# Patient Record
Sex: FEMALE | Race: WHITE | Employment: OTHER | URBAN - METROPOLITAN AREA
[De-identification: names, ages, dates, MRNs, and addresses within clinical notes are randomized per-mention and may not be internally consistent; named-entity substitution may affect disease eponyms.]

---

## 2017-03-10 ENCOUNTER — HOSPITAL ENCOUNTER (OUTPATIENT)
Dept: VASCULAR SURGERY | Age: 62
Discharge: HOME | End: 2017-03-10
Attending: FAMILY MEDICINE
Payer: COMMERCIAL

## 2017-03-10 DIAGNOSIS — I80.9 PHLEBITIS AND THROMBOPHLEBITIS OF UNSPECIFIED SITE: ICD-10-CM

## 2017-03-10 PROCEDURE — 93971 EXTREMITY STUDY: CPT

## 2017-03-10 NOTE — PROCEDURES
[address]  *** FINAL REPORT ***    Name: Dilma Mcclure  MRN: [id number]    Outpatient  : 30 Sep 1955  HIS Order #: [id number]  [id number] [facility] Visit #: [id number]  Date: 10 Mar 2017    TYPE OF TEST: Peripheral Venous Testing    REASON FOR TEST  Pain in limb, Limb swelling    Left Leg:-  Deep venous thrombosis:           No  Superficial venous thrombosis:    No  Deep venous insufficiency:        No  Superficial venous insufficiency: No      INTERPRETATION/FINDINGS  PROCEDURE:  LEFT LOWER EXTREMITY VENOUS DUPLEX . Evaluation of lower  extremity veins with ultrasound (B-mode imaging, pulsed Doppler, color   Doppler). Includes the common femoral, deep femoral, femoral,  popliteal, posterior tibial, peroneal, and great saphenous veins. Other veins, for example the gastrocnemius and soleal veins, may also  be visualized. FINDINGS: Jerelene Mungo scale and color flow duplex images of the veins in the  left lower extremity demonstrate normal compressibility, spontaneous  and augmented flow profiles, and absence of filling defects throughout   the deep and superficial veins in the left lower extremity. CONCLUSION: Left lower extremity venous duplex negative for deep  venous thrombosis or thrombophlebitis. Right common femoral vein is  thrombus free. ADDITIONAL COMMENTS    I have personally reviewed the data relevant to the interpretation of  this  study. TECHNOLOGIST: Clara Barba RVT  Signed: 03/10/2017 05:24 PM    PHYSICIAN: Sharifa Massey.  Colonel Montserrat MD  Signed: 2017 08:03 AM

## 2022-08-01 ENCOUNTER — OFFICE VISIT (OUTPATIENT)
Dept: FAMILY MEDICINE CLINIC | Age: 67
End: 2022-08-01
Payer: MEDICARE

## 2022-08-01 VITALS
HEART RATE: 66 BPM | HEIGHT: 64 IN | DIASTOLIC BLOOD PRESSURE: 75 MMHG | SYSTOLIC BLOOD PRESSURE: 111 MMHG | OXYGEN SATURATION: 98 % | TEMPERATURE: 98.1 F

## 2022-08-01 DIAGNOSIS — Z90.49 HISTORY OF PARTIAL COLECTOMY: ICD-10-CM

## 2022-08-01 DIAGNOSIS — R19.8 CHANGE IN BOWEL FUNCTION: ICD-10-CM

## 2022-08-01 DIAGNOSIS — M81.0 OSTEOPOROSIS WITHOUT CURRENT PATHOLOGICAL FRACTURE, UNSPECIFIED OSTEOPOROSIS TYPE: ICD-10-CM

## 2022-08-01 DIAGNOSIS — Z11.59 NEED FOR HEPATITIS C SCREENING TEST: ICD-10-CM

## 2022-08-01 DIAGNOSIS — G47.00 INSOMNIA, UNSPECIFIED TYPE: ICD-10-CM

## 2022-08-01 DIAGNOSIS — M15.9 PRIMARY OSTEOARTHRITIS INVOLVING MULTIPLE JOINTS: Primary | ICD-10-CM

## 2022-08-01 DIAGNOSIS — R63.5 WEIGHT GAIN: ICD-10-CM

## 2022-08-01 DIAGNOSIS — K76.0 FATTY LIVER: ICD-10-CM

## 2022-08-01 DIAGNOSIS — N28.9 KIDNEY LESION: ICD-10-CM

## 2022-08-01 DIAGNOSIS — Z86.718 HISTORY OF DVT (DEEP VEIN THROMBOSIS): ICD-10-CM

## 2022-08-01 PROBLEM — M15.0 PRIMARY OSTEOARTHRITIS INVOLVING MULTIPLE JOINTS: Status: ACTIVE | Noted: 2022-08-01

## 2022-08-01 PROCEDURE — 1101F PT FALLS ASSESS-DOCD LE1/YR: CPT | Performed by: FAMILY MEDICINE

## 2022-08-01 PROCEDURE — G8421 BMI NOT CALCULATED: HCPCS | Performed by: FAMILY MEDICINE

## 2022-08-01 PROCEDURE — G0463 HOSPITAL OUTPT CLINIC VISIT: HCPCS | Performed by: FAMILY MEDICINE

## 2022-08-01 PROCEDURE — G8427 DOCREV CUR MEDS BY ELIG CLIN: HCPCS | Performed by: FAMILY MEDICINE

## 2022-08-01 PROCEDURE — 99204 OFFICE O/P NEW MOD 45 MIN: CPT | Performed by: FAMILY MEDICINE

## 2022-08-01 PROCEDURE — 3017F COLORECTAL CA SCREEN DOC REV: CPT | Performed by: FAMILY MEDICINE

## 2022-08-01 PROCEDURE — 1123F ACP DISCUSS/DSCN MKR DOCD: CPT | Performed by: FAMILY MEDICINE

## 2022-08-01 PROCEDURE — G8432 DEP SCR NOT DOC, RNG: HCPCS | Performed by: FAMILY MEDICINE

## 2022-08-01 PROCEDURE — 1090F PRES/ABSN URINE INCON ASSESS: CPT | Performed by: FAMILY MEDICINE

## 2022-08-01 PROCEDURE — G8536 NO DOC ELDER MAL SCRN: HCPCS | Performed by: FAMILY MEDICINE

## 2022-08-01 RX ORDER — DICLOFENAC SODIUM 10 MG/G
2 GEL TOPICAL 4 TIMES DAILY
Qty: 350 G | Refills: 0 | Status: SHIPPED | OUTPATIENT
Start: 2022-08-01 | End: 2022-10-05

## 2022-08-01 RX ORDER — CAPSAICIN 0.1 %
CREAM (GRAM) TOPICAL 3 TIMES DAILY
Qty: 56.6 G | Refills: 0 | Status: SHIPPED | OUTPATIENT
Start: 2022-08-01 | End: 2022-10-05

## 2022-08-01 RX ORDER — MELOXICAM 15 MG/1
15 TABLET ORAL EVERY OTHER DAY
COMMUNITY

## 2022-08-01 RX ORDER — ALENDRONATE SODIUM 35 MG/1
TABLET ORAL
COMMUNITY

## 2022-08-01 NOTE — PROGRESS NOTES
Rinku James (: 1955) is a 78 y.o. female, new patient, here for evaluation of the following chief complaint(s):  Establish Care, Arthritis ((R) hand, (L) foot and thinks it may be starting in her (R) hip.), and Bowel Incontinence (Goes about 3 to 4 times a day, feels as though bowel is never empty. )         ASSESSMENT/PLAN:  Below is the assessment and plan developed based on review of pertinent history, physical exam, labs, studies, and medications. 1. Primary osteoarthritis involving multiple joints  Right hand and left foot  Continue Mobic as needed. Discussed with patient long-term risk of chronic NSAID use including gastric ulcers or development of CKD. Continue Motrin as needed  Patient also try capsaicin, do not recommend using this on her hand due to risk of irritation if she gets it in her eye or other mucosal membranes. -     diclofenac (VOLTAREN) 1 % gel; Apply 2 g to affected area four (4) times daily. , Normal, Disp-350 g, R-0  -     capsaicin (CAPZASIN-HP) 0.1 % topical cream; Apply  to affected area three (3) times daily. , Normal, Disp-56.6 g, R-0    2. Fatty liver  Patient with history of fatty liver, recommend dietary changes follow low-cholesterol heart healthy diet and exercise and lose weight. Patient will follow-up in 1 month to discuss weight loss further.  -     METABOLIC PANEL, COMPREHENSIVE; Future  -     LIPID PANEL; Future    3. Change in bowel function  Chronic change since she had partial colectomy and part of her rectum removed in  but worsening sensation of incompletely emptying her bowels in last 4 months. No abdominal pain, diarrhea, fevers, no concern for infection at this time. Will defer treatment to GI as she sees [facility]. Recommend she call them to follow-up. Continue Metamucil in the meantime.  -     TSH 3RD GENERATION; Future  -     CBC WITH AUTOMATED DIFF; Future    4.  Need for hepatitis C screening test  -     HEPATITIS C AB; Future    5. History of DVT (deep vein thrombosis)  Isolated incidence 2020 with subsequent 6 months of anticoagulation. No DVTs since then. No indication for chronic anticoagulation. 6. Insomnia, unspecified type  Difficulty falling asleep at night accompanied with some anxiety. Recommend she work on sleep hygiene and follow-up in 1 month to discuss mood and insomnia further. 7. Weight gain  Start heart healthy, low-cholesterol dietary changes. Follow-up in 1 month for further discussion. 8. Kidney lesion  Patient thinks she has a kidney polyp? Previously followed with [facility]. Possible this was a kidney cyst, will request records from [facility] Dr. Crystal Mccormick and Dr. Ronnie Marquez     9. History of partial colectomy  Following acute diverticulitis and possible perforation versus abscess in 2014 when she lived in [place]. Currently managed by [facility]. 10. Osteoporosis   Continue fosomax, DEXA due fall 2022, previous dexa records requested 8/1/2022     Return in about 4 weeks (around 8/29/2022) for records review, mood, weight loss. SUBJECTIVE/OBJECTIVE:  Chief Complaint   Patient presents with    Establish Care    Arthritis     (R) hand, (L) foot and thinks it may be starting in her (R) hip. Bowel Incontinence     Goes about 3 to 4 times a day, feels as though bowel is never empty. Patient presents to office to establish care. She is switching doctors, did not feel a connection with previous PCP. Known osteoarthritis in her hands and left foot. Particularly middle 3 fingers of her right hand. She treats this with topical Voltaren gel as needed and meloxicam every other day. Past medical history significant for partial colectomy in 2014 secondary to inflammation from diverticulitis?   Reports she was feeling off about 4 to 5 months, finally a CT of her abdomen was ordered and she was rushed to the ER for partial colectomy for diverticulitis? Unsure if there was a perforation or abscess. She subsequently had part of her rectum removed as well. Since then, she has had periodic colonoscopies with Dr. Nini Harkins I at [facility]. She does have a history of polyps, last colonoscopy was about a year ago. Patient reports since her surgery she does feel like she is unable to completely empty her bowels. She will have a soft bowel movement 3 times per day when she wipes she feels like there is stool in her rectum. She has to have a bowel movement again in 2 hours. She has no rectal pain, no blood in her stool, no fecal urgency or fecal incontinence. She takes Metamucil daily for constipation. She is also seeing [facility] Dr. Florentin Donaldson as well as Dr. Jihan Garcia. She was told she had a spot on her kidneys, patient thinks this was a polyp? We also told she was spot on her uterus but neither required any further follow-up and she has not seen them in 3 years. Fhx of polycystic kidney disease but not first degree relatives. She has been told she does not have polycystic kidney disease. Osteoporosis. Previously pcp started Fosamax about 2 years ago. Her DEXA scan was about 2 years ago, she thinks will be 2 years this fall. Cataract surgery in 2014 bilaterally. Has not seen optometrist in 5 years. She used to live in [place] and moved to [place] about 4 years ago. Recommend [id number]     DVT 2020 and took blood thinner for 6 months. This was following right knee surgery. She has no other bleeding disorders and is not on chronic anticoagulation. Also told the past she had fatty liver. She does note feelings of being overwhelmed would like to discuss anxiety and weight loss at a future follow-up appointment. No thoughts of harming self or others.     Review of systems negative other than mentioned in HPI    Current Outpatient Medications on File Prior to Visit   Medication Sig Dispense Refill    ASPIRIN PO Take  by mouth.      meloxicam (MOBIC) 15 mg tablet Take 15 mg by mouth every other day. alendronate (FOSAMAX) 35 mg tablet Take  by mouth every seven (7) days. mirabegron ER (Myrbetriq) 50 mg ER tablet Take 50 mg by mouth in the morning. No current facility-administered medications on file prior to visit. There are no problems to display for this patient. No Known Allergies  Past Surgical History:   Procedure Laterality Date    HX BACK SURGERY      HX CATARACT REMOVAL Bilateral     HX COLONOSCOPY      HX HERNIA REPAIR      HX ILEOSTOMY      HX KNEE REPLACEMENT Right      Social History     Tobacco Use    Smoking status: Never    Smokeless tobacco: Never   Substance Use Topics    Alcohol use: Yes     Alcohol/week: 2.0 standard drinks     Types: 1 Glasses of wine, 1 Cans of beer per week     Comment: occassional     Vitals:    08/01/22 0937   BP: 111/75   Pulse: 66   Temp: 98.1 °F (36.7 °C)   TempSrc: Oral   SpO2: 98%   Height: 5' 4\" (1.626 m)   PainSc:   0 - No pain        Physical Exam  Constitutional:       Appearance: Normal appearance. HENT:      Head: Normocephalic and atraumatic. Eyes:      Extraocular Movements: Extraocular movements intact. Conjunctiva/sclera: Conjunctivae normal.      Pupils: Pupils are equal, round, and reactive to light. Cardiovascular:      Rate and Rhythm: Normal rate and regular rhythm. Pulses: Normal pulses. Heart sounds: Normal heart sounds. Pulmonary:      Effort: Pulmonary effort is normal.      Breath sounds: Normal breath sounds. Abdominal:      General: Abdomen is flat. Bowel sounds are normal.      Palpations: Abdomen is soft. Neurological:      Mental Status: She is alert. Psychiatric:         Mood and Affect: Mood normal.         Behavior: Behavior normal.       An electronic signature was used to authenticate this note.   -- Thierno Farfan PA-C

## 2022-08-01 NOTE — PROGRESS NOTES
Marisol Greco is a 78 y.o. female , id x 2(name and ). Reviewed record, history, and  medications. Chief Complaint   Patient presents with    Establish Care    Arthritis     (R) hand, (L) foot and thinks it may be starting in her (R) hip. Bowel Incontinence     Goes about 3 to 4 times a day, feels as though bowel is never empty. Vitals:    22 0937   BP: 111/75   Pulse: 66   Temp: 98.1 °F (36.7 °C)   TempSrc: Oral   SpO2: 98%   Height: 5' 4\" (1.626 m)       Coordination of Care Questionnaire:   1. Have you been to the ER, urgent care clinic since your last visit? Hospitalized since your last visit? No    2. Have you seen or consulted any other health care providers outside of the [address] [place] [address] since your last visit? Include any pap smears or colon screening.  No

## 2022-08-02 PROBLEM — M81.0 OSTEOPOROSIS WITHOUT CURRENT PATHOLOGICAL FRACTURE: Status: ACTIVE | Noted: 2022-08-02

## 2022-08-02 LAB
ALBUMIN SERPL-MCNC: 3.9 G/DL (ref 3.5–5)
ALBUMIN/GLOB SERPL: 1.2 {RATIO} (ref 1.1–2.2)
ALP SERPL-CCNC: 104 U/L (ref 45–117)
ALT SERPL-CCNC: 24 U/L (ref 12–78)
ANION GAP SERPL CALC-SCNC: 3 MMOL/L (ref 5–15)
AST SERPL-CCNC: 20 U/L (ref 15–37)
BASOPHILS # BLD: 0 K/UL (ref 0–0.1)
BASOPHILS NFR BLD: 1 % (ref 0–1)
BILIRUB SERPL-MCNC: 0.9 MG/DL (ref 0.2–1)
BUN SERPL-MCNC: 23 MG/DL (ref 6–20)
BUN/CREAT SERPL: 22 (ref 12–20)
CALCIUM SERPL-MCNC: 9.8 MG/DL (ref 8.5–10.1)
CHLORIDE SERPL-SCNC: 109 MMOL/L (ref 97–108)
CHOLEST SERPL-MCNC: 201 MG/DL
CO2 SERPL-SCNC: 27 MMOL/L (ref 21–32)
CREAT SERPL-MCNC: 1.05 MG/DL (ref 0.55–1.02)
DIFFERENTIAL METHOD BLD: ABNORMAL
EOSINOPHIL # BLD: 0 K/UL (ref 0–0.4)
EOSINOPHIL NFR BLD: 1 % (ref 0–7)
ERYTHROCYTE [DISTWIDTH] IN BLOOD BY AUTOMATED COUNT: 13 % (ref 11.5–14.5)
GLOBULIN SER CALC-MCNC: 3.3 G/DL (ref 2–4)
GLUCOSE SERPL-MCNC: 104 MG/DL (ref 65–100)
HCT VFR BLD AUTO: 42.3 % (ref 35–47)
HCV AB SERPL QL IA: NONREACTIVE
HDLC SERPL-MCNC: 65 MG/DL
HDLC SERPL: 3.1 {RATIO} (ref 0–5)
HGB BLD-MCNC: 13.8 G/DL (ref 11.5–16)
IMM GRANULOCYTES # BLD AUTO: 0 K/UL (ref 0–0.04)
IMM GRANULOCYTES NFR BLD AUTO: 0 % (ref 0–0.5)
LDLC SERPL CALC-MCNC: 108 MG/DL (ref 0–100)
LYMPHOCYTES # BLD: 1.4 K/UL (ref 0.8–3.5)
LYMPHOCYTES NFR BLD: 24 % (ref 12–49)
MCH RBC QN AUTO: 32.4 PG (ref 26–34)
MCHC RBC AUTO-ENTMCNC: 32.6 G/DL (ref 30–36.5)
MCV RBC AUTO: 99.3 FL (ref 80–99)
MONOCYTES # BLD: 0.6 K/UL (ref 0–1)
MONOCYTES NFR BLD: 10 % (ref 5–13)
NEUTS SEG # BLD: 3.8 K/UL (ref 1.8–8)
NEUTS SEG NFR BLD: 64 % (ref 32–75)
NRBC # BLD: 0 K/UL (ref 0–0.01)
NRBC BLD-RTO: 0 PER 100 WBC
PLATELET # BLD AUTO: 223 K/UL (ref 150–400)
PMV BLD AUTO: 10 FL (ref 8.9–12.9)
POTASSIUM SERPL-SCNC: 4.4 MMOL/L (ref 3.5–5.1)
PROT SERPL-MCNC: 7.2 G/DL (ref 6.4–8.2)
RBC # BLD AUTO: 4.26 M/UL (ref 3.8–5.2)
SODIUM SERPL-SCNC: 139 MMOL/L (ref 136–145)
TRIGL SERPL-MCNC: 140 MG/DL (ref ?–150)
TSH SERPL DL<=0.05 MIU/L-ACNC: 1.39 UIU/ML (ref 0.36–3.74)
VLDLC SERPL CALC-MCNC: 28 MG/DL
WBC # BLD AUTO: 5.8 K/UL (ref 3.6–11)

## 2022-08-02 NOTE — PROGRESS NOTES
The 10-year ASCVD risk score (Franklin Lamar, et al., 2013) is: 5%  Hi Ms. Vicki Knowles,     Your metabolic panel shows your kidney function is a little low. This could be a result of taking the Mobic every other day. I recommend stopping this medication and making sure you are staying well-hydrated and we can recheck your labs in 4 weeks. I also recommend trying use of capsaicin cream rather than Voltaren gel as the Voltaren gel is also a nonsteroidal anti-inflammatory medication that does have some systemic absorption. Your glucose is also a touch high, recommend we check an A1c at your follow-up appointment to further evaluate for any diabetes or prediabetes. Your labs show the hepatitis C testing was negative. Your blood counts look great. Your thyroid function is normal.    Your cholesterol is a little high, I recommend focusing on dietary changes and having foods for conditions below. We can discuss this further at your follow-up appointment in 1 month. - Avoid foods that are high in saturated and trans fats (especially fried foods)   - Replace butter with olive oil, avocado oil, other oils that are primarily high in unsaturated fats  - Eat lots of fresh fruits and vegetables  - Choose fish, chicken, and turkey as your serving of meat  - Avoid too many processed foods  - Use whole wheat bread, pasta, rice  You should also try and do 30 minutes of aerobic exercise most days of the week. All of these will contribute to lowering your cholesterol and decrease your risk of heart disease and stroke. Let me know if you have any questions and I will see you in 1 month for follow-up!   Aurelio Marquis PA-C

## 2022-08-03 ENCOUNTER — DOCUMENTATION ONLY (OUTPATIENT)
Dept: FAMILY MEDICINE CLINIC | Age: 67
End: 2022-08-03

## 2022-08-03 NOTE — PROGRESS NOTES
Faxed Records Request form to [facility]-Dr. Heber Nieves (fax# [phone]). Confirmation received. Faxed Records Request form to Pattie Camejo (fax# [phone].) Confirmation received. Faxed Records Request form to Dr. Bo Doll (fax# [phone]). Confirmation received.

## 2022-08-04 ENCOUNTER — DOCUMENTATION ONLY (OUTPATIENT)
Dept: FAMILY MEDICINE CLINIC | Age: 67
End: 2022-08-04

## 2022-08-04 PROBLEM — N28.1 RENAL CYST: Status: ACTIVE | Noted: 2022-08-04

## 2022-08-04 PROBLEM — N28.9 KIDNEY LESION: Status: RESOLVED | Noted: 2022-08-01 | Resolved: 2022-08-04

## 2022-08-04 PROBLEM — R31.1 BENIGN ESSENTIAL MICROSCOPIC HEMATURIA: Status: ACTIVE | Noted: 2022-08-04

## 2022-08-04 NOTE — PROGRESS NOTES
Medical records from [address] [facility] was put on [address] [place] [address] desk to process.

## 2022-09-07 ENCOUNTER — OFFICE VISIT (OUTPATIENT)
Dept: FAMILY MEDICINE CLINIC | Age: 67
End: 2022-09-07
Payer: MEDICARE

## 2022-09-07 VITALS
BODY MASS INDEX: 40.68 KG/M2 | WEIGHT: 238.3 LBS | HEIGHT: 64 IN | HEART RATE: 68 BPM | RESPIRATION RATE: 16 BRPM | DIASTOLIC BLOOD PRESSURE: 77 MMHG | TEMPERATURE: 98.2 F | SYSTOLIC BLOOD PRESSURE: 131 MMHG | OXYGEN SATURATION: 99 %

## 2022-09-07 DIAGNOSIS — R73.9 HYPERGLYCEMIA: ICD-10-CM

## 2022-09-07 DIAGNOSIS — Z12.31 ENCOUNTER FOR SCREENING MAMMOGRAM FOR MALIGNANT NEOPLASM OF BREAST: ICD-10-CM

## 2022-09-07 DIAGNOSIS — M15.9 PRIMARY OSTEOARTHRITIS INVOLVING MULTIPLE JOINTS: ICD-10-CM

## 2022-09-07 DIAGNOSIS — F32.A DEPRESSION, UNSPECIFIED DEPRESSION TYPE: Primary | ICD-10-CM

## 2022-09-07 DIAGNOSIS — E66.01 MORBID OBESITY WITH BMI OF 45.0-49.9, ADULT (HCC): ICD-10-CM

## 2022-09-07 DIAGNOSIS — N39.41 URGE INCONTINENCE OF URINE: ICD-10-CM

## 2022-09-07 DIAGNOSIS — G47.00 INSOMNIA, UNSPECIFIED TYPE: ICD-10-CM

## 2022-09-07 DIAGNOSIS — R79.89 ELEVATED SERUM CREATININE: ICD-10-CM

## 2022-09-07 DIAGNOSIS — N28.9 KIDNEY LESION: ICD-10-CM

## 2022-09-07 PROCEDURE — G8432 DEP SCR NOT DOC, RNG: HCPCS | Performed by: FAMILY MEDICINE

## 2022-09-07 PROCEDURE — G8536 NO DOC ELDER MAL SCRN: HCPCS | Performed by: FAMILY MEDICINE

## 2022-09-07 PROCEDURE — G9899 SCRN MAM PERF RSLTS DOC: HCPCS | Performed by: FAMILY MEDICINE

## 2022-09-07 PROCEDURE — G8417 CALC BMI ABV UP PARAM F/U: HCPCS | Performed by: FAMILY MEDICINE

## 2022-09-07 PROCEDURE — 3017F COLORECTAL CA SCREEN DOC REV: CPT | Performed by: FAMILY MEDICINE

## 2022-09-07 PROCEDURE — 1090F PRES/ABSN URINE INCON ASSESS: CPT | Performed by: FAMILY MEDICINE

## 2022-09-07 PROCEDURE — G0463 HOSPITAL OUTPT CLINIC VISIT: HCPCS | Performed by: FAMILY MEDICINE

## 2022-09-07 PROCEDURE — 1101F PT FALLS ASSESS-DOCD LE1/YR: CPT | Performed by: FAMILY MEDICINE

## 2022-09-07 PROCEDURE — 99214 OFFICE O/P EST MOD 30 MIN: CPT | Performed by: FAMILY MEDICINE

## 2022-09-07 PROCEDURE — 1123F ACP DISCUSS/DSCN MKR DOCD: CPT | Performed by: FAMILY MEDICINE

## 2022-09-07 PROCEDURE — G8427 DOCREV CUR MEDS BY ELIG CLIN: HCPCS | Performed by: FAMILY MEDICINE

## 2022-09-07 RX ORDER — BUPROPION HYDROCHLORIDE 150 MG/1
150 TABLET ORAL DAILY
Qty: 60 TABLET | Refills: 1 | Status: SHIPPED | OUTPATIENT
Start: 2022-09-07 | End: 2022-10-05 | Stop reason: DRUGHIGH

## 2022-09-07 RX ORDER — BISMUTH SUBSALICYLATE 262 MG
1 TABLET,CHEWABLE ORAL DAILY
COMMUNITY

## 2022-09-07 NOTE — PROGRESS NOTES
Aura Chavez (: 1955) is a 78 y.o. female, established patient, here for evaluation of the following chief complaint(s):  Follow-up (Wants to discuss her mood. Feels she does not have the ambition to get up and do things. /Doesn't sleep well so is tired most of the time. /Wants to discuss weight loss, hasn't been losing weight. Eats whenever she is bored and sad. ), Medication Refill (Myrebetriq), Medication Evaluation (Wants to discuss Capsacin topical cream./Doesn't use it very much because it burns really bad unless she applies the exact amount./Wants to discuss Meloxicam./Pain in her (R) hand has gotten worse since stopped taking it. ), and Mammogram (Would like an order for a mammogram.)         ASSESSMENT/PLAN:  Below is the assessment and plan developed based on review of pertinent history, physical exam, labs, studies, and medications. 1. Depression, unspecified depression type  Trial Wellbutrin, patient very much wants to avoid any medication that has a potential to cause weight gain  -Patient does not want to talk to a counselor at this time.  -She has no thoughts of harming her self or others but if this ever occurs she is to return to office or seek medical help and she voiced understanding.  -     buPROPion XL (WELLBUTRIN XL) 150 mg tablet; Take 1 Tablet by mouth daily. If tolerating well after 1 week increase to 2 tablets by mouth daily, Normal, Disp-60 Tablet, R-1    2. Encounter for screening mammogram for malignant neoplasm of breast  -     [facility] MAMMO BI SCREENING INCL CAD; Future    3. Morbid obesity with BMI of 45.0-49.9, adult ([facility] [address]  Will address mood as above to help with stress eating and then can address weight further as mood is well controlled    4. Hyperglycemia  Noted on last BMP. A1c today  -     HEMOGLOBIN A1C WITH EAG; Future    5.  Elevated serum creatinine  Lab Results   Component Value Date/Time    Creatinine 1.05 (H) 2022 10:47 AM   Borderline high last labs, patient stopped meloxicam after this. Recheck today    -     METABOLIC PANEL, BASIC; Future    6. Kidney lesion  Per review of records, 10 mm Bosniak type II cyst seen on CT 2020 with [facility], no further follow-up was recommended    7. Urge incontinence of urine  Symptoms are well controlled when she actually takes Myrbetriq, recommend she take this regularly for symptomatic control  -     mirabegron ER (MYRBETRIQ) 50 mg ER tablet; Take 1 Tablet by mouth daily. , Normal, Disp-90 Tablet, R-1    8. Insomnia, unspecified type  Trial metformin 5 mg at night, can increase to 10 mg if needed. Treat mood with Wellbutrin as above    9. Primary osteoarthritis involving multiple joints  Predominantly middle 3 fingers, low back and right hip. Increased pain since having meloxicam.  Discussed with patient the risk versus benefit bit of meloxicam with CKD. She was understanding, will trial cutting meloxicam 15 mg in half and take 7.5 mg as needed for pain. Can try taking Tylenol over-the-counter strength as well as needed    Return in about 4 weeks (around 10/5/2022) for ok for virtual, mood. SUBJECTIVE/OBJECTIVE:  Chief Complaint   Patient presents with    Follow-up     Wants to discuss her mood. Feels she does not have the ambition to get up and do things. Doesn't sleep well so is tired most of the time. Wants to discuss weight loss, hasn't been losing weight. Eats whenever she is bored and sad. Medication Refill     Myrebetriq    Medication Evaluation     Wants to discuss Capsacin topical cream.  Doesn't use it very much because it burns really bad unless she applies the exact amount. Wants to discuss Meloxicam.  Pain in her (R) hand has gotten worse since stopped taking it. Mammogram     Would like an order for a mammogram.     Patient presents the office for multiple complaints. Pain-osteoarthritis middle 3 fingers of right hand-she was taking meloxicam for pain in fingers.   She stopped these after labs noted elevated creatinine 1 month ago. She reports without taking meloxicam every other day the joints in her right hand are stiff and more painful. With meloxicam every other day she notes irritation in this area but no pain per se. She has also noticed an increase in low back pain and right hip pain since stopping the meloxicam where she also knows she has osteoarthritis. She is not using Voltaren gel either due to risk of systemic absorption. She tried the capsaicin cream but found it difficult to use on her fingers without being able to touch her face afterwards. She does note that she had a herniated disc several years ago in her low back. She has not had issues several years, the last month she has had mild discomfort in her low lumbar spine particular first in the morning when she wakes up. No pain or weakness in her lower extremities. No saddle anesthesia. She needs repeat mammogram, no change in her breasts and no previous abnormal mammograms. No nipple sensitivity, discharge, overlying skin changes. She has an appointment with GI for change in bowel function. Dr. Donya Vogel-- October 26th. Herniated disc several years ago-- discomfort in low back in last month. First thing in the AM. Little radiation down right side. Not interested in PT. Sleep-- does not sleep well. Wakes once or twice per night for bathroom. Also tosses and turns. Takes her an hour to to fall asleep at night and she does wake frequently. If she get 7 hours of sleep that is good for her. She is tired throughout the day. She does not snore or wake up gasping for air. She has tried over-the-counter sleep aids but has not tried melatonin.    -Also notes family has been worsening the last several years since she moved to [place] from [place]. Notes overall lack of motivation and fatigue throughout the day. She does have some feelings of being down. No thoughts of harming self or others. No anxiety. Reports her marriage with her  is not great. They just do not connect- no abuse. Has not seen her children many years. She feels lonely and overall lack of motivation and sadness. No history of seizures or family history of seizure disorder. Review of systems negative other than mentioned in HPI      Current Outpatient Medications on File Prior to Visit   Medication Sig Dispense Refill    psyllium husk (METAMUCIL PO) Take  by mouth.      cranberry fruit extract (CRANBERRY CONCENTRATE PO) Take  by mouth.      elderberry fruit (ELDERBERRY PO) Take  by mouth.      multivitamin (ONE A DAY) tablet Take 1 Tablet by mouth daily. ASPIRIN PO Take  by mouth. alendronate (FOSAMAX) 35 mg tablet Take  by mouth every seven (7) days. capsaicin (CAPZASIN-HP) 0.1 % topical cream Apply  to affected area three (3) times daily. 56.6 g 0    meloxicam (MOBIC) 15 mg tablet Take 15 mg by mouth every other day. (Patient not taking: Reported on 9/7/2022)      diclofenac (VOLTAREN) 1 % gel Apply 2 g to affected area four (4) times daily. (Patient not taking: Reported on 9/7/2022) 350 g 0    [DISCONTINUED] mirabegron ER (MYRBETRIQ) 50 mg ER tablet Take 50 mg by mouth in the morning. No current facility-administered medications on file prior to visit. Patient Active Problem List   Diagnosis Code    History of DVT (deep vein thrombosis) Z86.718    Primary osteoarthritis involving multiple joints M15.9    Fatty liver K76.0    History of partial colectomy Z90.49    Osteoporosis without current pathological fracture M81.0    Renal cyst N28.1    Benign essential microscopic hematuria R31.1     No Known Allergies    Vitals:    09/07/22 0839   BP: 131/77   Pulse: 68   Resp: 16   Temp: 98.2 °F (36.8 °C)   TempSrc: Oral   SpO2: 99%   Weight: 238 lb 4.8 oz (108.1 kg)   Height: 5' 4\" (1.626 m)   PainSc:   6   PainLoc: Hand        Physical Exam  Constitutional:       Appearance: Normal appearance.  She is obese.   HENT:      Head: Normocephalic and atraumatic. Eyes:      Extraocular Movements: Extraocular movements intact. Conjunctiva/sclera: Conjunctivae normal.      Pupils: Pupils are equal, round, and reactive to light. Cardiovascular:      Rate and Rhythm: Normal rate and regular rhythm. Pulses: Normal pulses. Heart sounds: Normal heart sounds. Pulmonary:      Effort: Pulmonary effort is normal.      Breath sounds: Normal breath sounds. Abdominal:      General: Abdomen is flat. Bowel sounds are normal.      Palpations: Abdomen is soft. Musculoskeletal:      Right lower leg: No edema. Left lower leg: No edema. Neurological:      Mental Status: She is alert. Psychiatric:         Mood and Affect: Mood normal. Affect is tearful. Behavior: Behavior normal.       An electronic signature was used to authenticate this note.   -- Caleb Young PA-C

## 2022-09-07 NOTE — PROGRESS NOTES
Mela Taylor is a 78 y.o. female , id x 2(name and ). Reviewed record, history, and  medications. Chief Complaint   Patient presents with    Follow-up     Wants to discuss her mood. Feels she does not have the ambition to get up and do things. Doesn't sleep well so is tired most of the time. Wants to discuss weight loss, hasn't been losing weight. Eats whenever she is bored and sad. Medication Refill     Myrebetriq    Medication Evaluation     Wants to discuss Capsacin topical cream.  Doesn't use it very much because it burns really bad unless she applies the exact amount. Wants to discuss Meloxicam.  Pain in her (R) hand has gotten worse since stopped taking it. Mammogram     Would like an order for a mammogram.         Vitals:    22 0839   BP: 131/77   Pulse: 68   Resp: 16   Temp: 98.2 °F (36.8 °C)   TempSrc: Oral   SpO2: 99%   Weight: 238 lb 4.8 oz (108.1 kg)   Height: 5' 4\" (1.626 m)       Coordination of Care Questionnaire:   1. Have you been to the ER, urgent care clinic since your last visit? Hospitalized since your last visit? No    2. Have you seen or consulted any other health care providers outside of the [address] [place] [address] since your last visit? Include any pap smears or colon screening.  Yes [facility] Dr. Caryl Gonzalez

## 2022-09-08 LAB
ANION GAP SERPL CALC-SCNC: 6 MMOL/L (ref 5–15)
BUN SERPL-MCNC: 16 MG/DL (ref 6–20)
BUN/CREAT SERPL: 15 (ref 12–20)
CALCIUM SERPL-MCNC: 9.9 MG/DL (ref 8.5–10.1)
CHLORIDE SERPL-SCNC: 108 MMOL/L (ref 97–108)
CO2 SERPL-SCNC: 26 MMOL/L (ref 21–32)
CREAT SERPL-MCNC: 1.05 MG/DL (ref 0.55–1.02)
EST. AVERAGE GLUCOSE BLD GHB EST-MCNC: 111 MG/DL
GLUCOSE SERPL-MCNC: 96 MG/DL (ref 65–100)
HBA1C MFR BLD: 5.5 % (ref 4–5.6)
POTASSIUM SERPL-SCNC: 4.8 MMOL/L (ref 3.5–5.1)
SODIUM SERPL-SCNC: 140 MMOL/L (ref 136–145)

## 2022-09-08 NOTE — PROGRESS NOTES
[employer] message sent to patient with results: You kidney function is stable compared to 1 month ago. I recommend continuing the plan we discussed in office yesterday regarding the meloxicam, try the lower dose and see how it goes. Your A1C is normal indicating no diabetes or prediabetes at this time. I will see you in a month for your follow up!    Let me know if you have any questions,  Irasema White PA-C

## 2022-09-23 LAB — MAMMOGRAPHY, EXTERNAL: NORMAL

## 2022-10-05 ENCOUNTER — VIRTUAL VISIT (OUTPATIENT)
Dept: FAMILY MEDICINE CLINIC | Age: 67
End: 2022-10-05
Payer: MEDICARE

## 2022-10-05 ENCOUNTER — DOCUMENTATION ONLY (OUTPATIENT)
Dept: FAMILY MEDICINE CLINIC | Age: 67
End: 2022-10-05

## 2022-10-05 DIAGNOSIS — Z78.0 POSTMENOPAUSAL STATE: ICD-10-CM

## 2022-10-05 DIAGNOSIS — M81.0 OSTEOPOROSIS WITHOUT CURRENT PATHOLOGICAL FRACTURE, UNSPECIFIED OSTEOPOROSIS TYPE: ICD-10-CM

## 2022-10-05 DIAGNOSIS — F32.A DEPRESSION, UNSPECIFIED DEPRESSION TYPE: Primary | ICD-10-CM

## 2022-10-05 PROCEDURE — G8432 DEP SCR NOT DOC, RNG: HCPCS | Performed by: FAMILY MEDICINE

## 2022-10-05 PROCEDURE — G8427 DOCREV CUR MEDS BY ELIG CLIN: HCPCS | Performed by: FAMILY MEDICINE

## 2022-10-05 PROCEDURE — 3017F COLORECTAL CA SCREEN DOC REV: CPT | Performed by: FAMILY MEDICINE

## 2022-10-05 PROCEDURE — 1123F ACP DISCUSS/DSCN MKR DOCD: CPT | Performed by: FAMILY MEDICINE

## 2022-10-05 PROCEDURE — G9899 SCRN MAM PERF RSLTS DOC: HCPCS | Performed by: FAMILY MEDICINE

## 2022-10-05 PROCEDURE — 1090F PRES/ABSN URINE INCON ASSESS: CPT | Performed by: FAMILY MEDICINE

## 2022-10-05 PROCEDURE — 1101F PT FALLS ASSESS-DOCD LE1/YR: CPT | Performed by: FAMILY MEDICINE

## 2022-10-05 PROCEDURE — G0463 HOSPITAL OUTPT CLINIC VISIT: HCPCS | Performed by: FAMILY MEDICINE

## 2022-10-05 PROCEDURE — 99214 OFFICE O/P EST MOD 30 MIN: CPT | Performed by: FAMILY MEDICINE

## 2022-10-05 RX ORDER — BUPROPION HYDROCHLORIDE 300 MG/1
300 TABLET ORAL DAILY
Qty: 90 TABLET | Refills: 0 | Status: SHIPPED | OUTPATIENT
Start: 2022-10-05

## 2022-10-05 NOTE — PROGRESS NOTES
Ale Whipple (: 1955) is a 75 y.o. female, established patient, here for evaluation of the following chief complaint(s):   Follow-up (Mood./Has been pretty much the same. /Medication has helped her lose 4 1/2lbs. )       ASSESSMENT/PLAN:  Below is the assessment and plan developed based on review of pertinent history, labs, studies, and medications. 1. Depression, unspecified depression type  Improvement with wellbutrin, has also helped with stress eating and she has lost 4 lbs   Continue wellbutrin   OK to increase melatonin from 2mg to 5mg to see if this further improves insomnia  -     buPROPion XL (WELLBUTRIN XL) 300 mg XL tablet; Take 1 Tablet by mouth daily. , Normal, Disp-90 Tablet, R-0    2. Osteoporosis without current pathological fracture, unspecified osteoporosis type  Last Dexa fall  at [facility] facility), records were requested 1 mo ago but have not yet been received  Regardless, it is time to repeat Dexa, ordered today and faxed to [facility]  Continue alendronate pending Dexa results    -     DEXA BONE DENSITY STUDY AXIAL; Future  3. Postmenopausal state  -     DEXA BONE DENSITY STUDY AXIAL; Future    Follow  up 2 to 3 months mood and MWV    SUBJECTIVE/OBJECTIVE:  Chief Complaint   Patient presents with    Follow-up     Mood. Has been pretty much the same. Medication has helped her lose 4 1/2lbs. Patient presents for follow up mood. She started wellbutrin XL 150mg x 1 week then increased to 300mg daily. She has been on 300mg daily x 2 weeks. She has lost 4 to 5 lbs on this medication. She is thinking about weight loss and trying to eat healthier. She has noticed change in her appetite and stress eating. She has noticed less appetite for sweets and snacking. She is able to control her urges to snack.  Not eating after 8pm. She is not seeing a counselor, she still has strained relationship with  and kids but does feel the medication has helped this feel more manageable. She is sleeping a little better, falling asleep within an hour of taking melatonin. the over the counter melatonin was 2mg and she just bought 5mg melatonin and wants to try that dose to see if that helps her fall asleep faster. Sleeping about 7 hours at night, not waking frequently. NO SI/HI, no hallucinations. No n/v, tremors, no seizures, no nightmares. She also is due for repeat DEXA. Last DEXA patient thinks was 2019. She is taking fosamax for osteoporosis, unsure what sites. She denies any fracture. Tolerates fosamax well. Patient-Reported Weight: 232lb     Patient Active Problem List    Diagnosis Date Noted    Renal cyst 08/04/2022    Benign essential microscopic hematuria 08/04/2022    Osteoporosis without current pathological fracture 08/02/2022    History of DVT (deep vein thrombosis) 08/01/2022    Primary osteoarthritis involving multiple joints 08/01/2022    Fatty liver 08/01/2022    History of partial colectomy 08/01/2022     No Known Allergies  Past Surgical History:   Procedure Laterality Date    HX BACK SURGERY      HX CATARACT REMOVAL Bilateral     HX COLONOSCOPY      HX HERNIA REPAIR      HX ILEOSTOMY      HX KNEE REPLACEMENT Right      Social History     Tobacco Use    Smoking status: Never    Smokeless tobacco: Never   Substance Use Topics    Alcohol use:  Yes     Alcohol/week: 2.0 standard drinks     Types: 1 Glasses of wine, 1 Cans of beer per week     Comment: occassional     Family History   Problem Relation Age of Onset    Lung Cancer Mother     Colon Cancer Father     Liver Cancer Father        Chris Laboyles:  \"[x]\" Indicates a positive item  \"[]\" Indicates a negative item  -- DELETE ALL ITEMS NOT EXAMINED]    Constitutional: [x] Appears well-developed and well-nourished [x] No apparent distress      [] Abnormal -     Mental status: [x] Alert and awake  [x] Oriented to person/place/time [x] Able to follow commands    [] Abnormal -     Eyes:   EOM    [x]  Normal    [] Abnormal -   Sclera  [x]  Normal    [] Abnormal -          Discharge [x]  None visible   [] Abnormal -     HENT: [x] Normocephalic, atraumatic  [] Abnormal -   [x] Mouth/Throat: Mucous membranes are moist    External Ears [x] Normal  [] Abnormal -    Neck: [x] No visualized mass [] Abnormal -     Pulmonary/Chest: [x] Respiratory effort normal   [x] No visualized signs of difficulty breathing or respiratory distress        [] Abnormal -      Musculoskeletal:   [x] Normal gait with no signs of ataxia         [x] Normal range of motion of neck        [] Abnormal -     Neurological:        [x] No Facial Asymmetry (Cranial nerve 7 motor function) (limited exam due to video visit)          [x] No gaze palsy        [] Abnormal -          Skin:        [x] No significant exanthematous lesions or discoloration noted on facial skin         [] Abnormal -            Psychiatric:       [x] Normal Affect [] Abnormal -        [x] No Hallucinations    Other pertinent observable physical exam findings:-    On this date 10/05/2022 I have spent 30 minutes reviewing previous notes, test results and face to face (virtual) with the patient discussing the diagnosis and importance of compliance with the treatment plan as well as documenting on the day of the visit. George Louie, was evaluated through a synchronous (real-time) audio-video encounter. The patient (or guardian if applicable) is aware that this is a billable service, which includes applicable co-pays. This Virtual Visit was conducted with patient's (and/or legal guardian's) consent. The visit was conducted pursuant to the emergency declaration under the [address] [place] [address], [address] [place] [address] authority and the [employer] General Act. Patient identification was verified, and a caregiver was present when appropriate.   The patient was located at: Home: [address] [place] [address] Dr Froilan Cortez [id number]  The provider was located at: Facility (Appt Department): [address] [place] [address]       An electronic signature was used to authenticate this note.   -- JOSE MichaelsC

## 2022-10-05 NOTE — PROGRESS NOTES
Carisa Faith is a 75 y.o. female , id x 2(name and ). Reviewed questionnaires, and  medications. Chief Complaint   Patient presents with    Follow-up     Mood. Has been pretty much the same. Medication has helped her lose 4 1/2lbs. 1. Have you been to the ER, urgent care clinic since your last visit? Hospitalized since your last visit? No    2. Have you seen or consulted any other health care providers outside of the [address] [place] [address] since your last visit? Include any pap smears or colon screening.  Yes [facility]

## 2022-11-01 ENCOUNTER — DOCUMENTATION ONLY (OUTPATIENT)
Dept: FAMILY MEDICINE CLINIC | Age: 67
End: 2022-11-01

## 2022-11-02 ENCOUNTER — TELEPHONE (OUTPATIENT)
Dept: FAMILY MEDICINE CLINIC | Age: 67
End: 2022-11-02

## 2022-11-02 NOTE — TELEPHONE ENCOUNTER
I called and spoke with patient. Received DEXA result from 10/28/22  DEXA scan show osteopenia in femoral necks, t score left femoral neck -1.4, t score right femoral neck is -1.5. Patient is tolerating fosamax well, no dysphagia and can sit up to take this medication without difficulty for 30 minutes. I do not have previous dexa to compare, however radiologist was able to compare to previous dexa and notes there is a increase in bone mineral density in lumbar spine by 9.5%, increase in remoral necks of 3.6% since prior study 1/22/2020. After discussion with patient, plan to continue fosamax and repeat dexa in 2 to 4 years. Report sent to scan into chart.

## 2022-11-11 DIAGNOSIS — M81.0 OSTEOPOROSIS WITHOUT CURRENT PATHOLOGICAL FRACTURE, UNSPECIFIED OSTEOPOROSIS TYPE: ICD-10-CM

## 2022-11-11 DIAGNOSIS — Z78.0 POSTMENOPAUSAL STATE: ICD-10-CM

## 2023-01-09 ENCOUNTER — OFFICE VISIT (OUTPATIENT)
Dept: FAMILY MEDICINE CLINIC | Age: 68
End: 2023-01-09
Payer: MEDICARE

## 2023-01-09 VITALS
BODY MASS INDEX: 39.59 KG/M2 | SYSTOLIC BLOOD PRESSURE: 121 MMHG | DIASTOLIC BLOOD PRESSURE: 76 MMHG | RESPIRATION RATE: 16 BRPM | WEIGHT: 231.9 LBS | HEIGHT: 64 IN | TEMPERATURE: 97.9 F | HEART RATE: 67 BPM | OXYGEN SATURATION: 98 %

## 2023-01-09 DIAGNOSIS — F32.A DEPRESSION, UNSPECIFIED DEPRESSION TYPE: ICD-10-CM

## 2023-01-09 DIAGNOSIS — R13.10 DYSPHAGIA, UNSPECIFIED TYPE: ICD-10-CM

## 2023-01-09 DIAGNOSIS — G47.00 INSOMNIA, UNSPECIFIED TYPE: ICD-10-CM

## 2023-01-09 DIAGNOSIS — Z00.00 MEDICARE ANNUAL WELLNESS VISIT, SUBSEQUENT: Primary | ICD-10-CM

## 2023-01-09 DIAGNOSIS — H93.13 TINNITUS OF BOTH EARS: ICD-10-CM

## 2023-01-09 DIAGNOSIS — N39.41 URGE INCONTINENCE OF URINE: ICD-10-CM

## 2023-01-09 DIAGNOSIS — L60.8 NAIL DISCOLORATION: ICD-10-CM

## 2023-01-09 PROCEDURE — G0463 HOSPITAL OUTPT CLINIC VISIT: HCPCS | Performed by: FAMILY MEDICINE

## 2023-01-09 PROCEDURE — 1090F PRES/ABSN URINE INCON ASSESS: CPT | Performed by: FAMILY MEDICINE

## 2023-01-09 PROCEDURE — G0439 PPPS, SUBSEQ VISIT: HCPCS | Performed by: FAMILY MEDICINE

## 2023-01-09 PROCEDURE — G9899 SCRN MAM PERF RSLTS DOC: HCPCS | Performed by: FAMILY MEDICINE

## 2023-01-09 PROCEDURE — 99214 OFFICE O/P EST MOD 30 MIN: CPT | Performed by: FAMILY MEDICINE

## 2023-01-09 PROCEDURE — G8417 CALC BMI ABV UP PARAM F/U: HCPCS | Performed by: FAMILY MEDICINE

## 2023-01-09 PROCEDURE — G8427 DOCREV CUR MEDS BY ELIG CLIN: HCPCS | Performed by: FAMILY MEDICINE

## 2023-01-09 PROCEDURE — 1101F PT FALLS ASSESS-DOCD LE1/YR: CPT | Performed by: FAMILY MEDICINE

## 2023-01-09 PROCEDURE — G8432 DEP SCR NOT DOC, RNG: HCPCS | Performed by: FAMILY MEDICINE

## 2023-01-09 PROCEDURE — G8536 NO DOC ELDER MAL SCRN: HCPCS | Performed by: FAMILY MEDICINE

## 2023-01-09 PROCEDURE — 3017F COLORECTAL CA SCREEN DOC REV: CPT | Performed by: FAMILY MEDICINE

## 2023-01-09 PROCEDURE — 1123F ACP DISCUSS/DSCN MKR DOCD: CPT | Performed by: FAMILY MEDICINE

## 2023-01-09 NOTE — PROGRESS NOTES
Leidy Davenport (: 1955) is a 75 y.o. female, established patient, here for evaluation of the following chief complaint(s): Annual Wellness Visit, Medication Refill (Wellbutrin. ), Aphagia (Pt reports has been having difficulty swallowing off and on for the past few years. /Doesn't happen often but when it does occur it has worsened. /Has some drainage and usually has to think before she swallows. ), and Other (Great toe on (R) foot has been discolored since 2022.)         ASSESSMENT/PLAN:  Below is the assessment and plan developed based on review of pertinent history, physical exam, labs, studies, and medications. 1. Dysphagia, unspecified type  Hx of needing endoscopy but she is unsure why. No alarm sx. Possible stricture, refer to GI  -reviewed ed precautions for acute dysphagia and lodged food particle and she voiced understanding   -     REFERRAL TO GASTROENTEROLOGY    2. Nail discoloration  Persistent discoloration since stubbing toe several months ago, refer to podiatry   -     REFERRAL TO PODIATRY    3. Depression, unspecified depression type  Well controlled on wellbutrin, continue rx     4. Insomnia, unspecified type  Well controlled with melatonin, continue rx     5. Urge incontinence of urine  Well controlled with myrbetriq, continue rx     Return in about 6 months (around 2023). SUBJECTIVE/OBJECTIVE:  Chief Complaint   Patient presents with    Annual Wellness Visit    Medication Refill     Wellbutrin. Aphagia     Pt reports has been having difficulty swallowing off and on for the past few years. Doesn't happen often but when it does occur it has worsened. Has some drainage and usually has to think before she swallows. Other     Great toe on (R) foot has been discolored since 2022. Patient is 76 yo female with osteoarthritis, osteoporosis, depression, urge incontinence presenting to office for follow up and mwv.      She has living will and advance direction. She has gotten shingrex and prevnar vaccines, unsure about tdap booster. She will check. Depression is improved with wellbutrin and she has lost additional 7 lbs in 3 months. No SI/HI. She is sleeping better with melatonin as well.   -she is a member of [employer] in [place] [address]. For urge incontinence-- well controlled with mirbegron. For dysphagia-- for last several months notes sometimes hard initiating swallow and also feeling food is tight in her throat/gets stuck in her throat but passes with water. Not specifically bread, chicken and it does not occur daily. This happened in the past and she needed endoscopy. No night sweats, fever, chills, abdominal pain. Denies epigastric pain, substernal pain, sour taste in mouth. No diarrhea, change in bowel function, abdominal pain. For toe discoloration-- started a few months ago, jammed toenail and part fell off but discoloration never went away. Streaks of blue/black in toe. No pain, swelling in the toe. For osteoarthritis in back, she is receiving injections from ortho and as another next week and this is going well. Review of Systems   Constitutional:  Negative for fatigue, fever and unexpected weight change. HENT:  Negative for hearing loss. Eyes:  Negative for pain and visual disturbance. Respiratory:  Negative for cough, chest tightness and shortness of breath. Cardiovascular:  Negative for chest pain, palpitations and leg swelling. Gastrointestinal:  Negative for abdominal distention, abdominal pain, blood in stool, constipation and diarrhea. Endocrine: Negative for cold intolerance, heat intolerance, polydipsia and polyphagia. Musculoskeletal:  Negative for joint swelling. Neurological:  Negative for dizziness, light-headedness and headaches. Psychiatric/Behavioral:  Negative for agitation and behavioral problems.       Current Outpatient Medications on File Prior to Visit   Medication Sig Dispense Refill    buPROPion XL (WELLBUTRIN XL) 300 mg XL tablet Take 1 Tablet by mouth daily. 90 Tablet 0    psyllium husk (METAMUCIL PO) Take  by mouth.      cranberry fruit extract (CRANBERRY CONCENTRATE PO) Take  by mouth.      elderberry fruit (ELDERBERRY PO) Take  by mouth.      multivitamin (ONE A DAY) tablet Take 1 Tablet by mouth daily. mirabegron ER (MYRBETRIQ) 50 mg ER tablet Take 1 Tablet by mouth daily. 90 Tablet 1    ASPIRIN PO Take  by mouth.      meloxicam (MOBIC) 15 mg tablet Take 15 mg by mouth every other day. alendronate (FOSAMAX) 35 mg tablet Take  by mouth every seven (7) days. No current facility-administered medications on file prior to visit. Vitals:    01/09/23 0943   BP: 121/76   Pulse: 67   Resp: 16   Temp: 97.9 °F (36.6 °C)   TempSrc: Oral   SpO2: 98%   Weight: 231 lb 14.4 oz (105.2 kg)   Height: 5' 4\" (1.626 m)   PainSc:   0 - No pain        Physical Exam  Constitutional:       Appearance: Normal appearance. HENT:      Head: Normocephalic and atraumatic. Salivary Glands: Right salivary gland is not diffusely enlarged or tender. Left salivary gland is not diffusely enlarged or tender. Right Ear: Tympanic membrane, ear canal and external ear normal.      Left Ear: Tympanic membrane, ear canal and external ear normal.      Mouth/Throat:      Tongue: No lesions. Palate: No mass and lesions. Pharynx: No pharyngeal swelling, oropharyngeal exudate, posterior oropharyngeal erythema or uvula swelling. Tonsils: No tonsillar abscesses. Eyes:      Extraocular Movements: Extraocular movements intact. Conjunctiva/sclera: Conjunctivae normal.      Pupils: Pupils are equal, round, and reactive to light. Neck:      Thyroid: No thyroid mass, thyromegaly or thyroid tenderness. Cardiovascular:      Rate and Rhythm: Normal rate and regular rhythm. Pulses: Normal pulses. Heart sounds: Normal heart sounds.    Pulmonary:      Effort: Pulmonary effort is normal.      Breath sounds: Normal breath sounds. Abdominal:      General: Abdomen is flat. Bowel sounds are normal.      Palpations: Abdomen is soft. Musculoskeletal:      Cervical back: Normal range of motion. No rigidity or tenderness. Right lower leg: No edema. Left lower leg: No edema. Feet:    Lymphadenopathy:      Cervical: No cervical adenopathy. Neurological:      General: No focal deficit present. Mental Status: She is alert and oriented to person, place, and time. Psychiatric:         Mood and Affect: Mood normal.         Behavior: Behavior normal.           An electronic signature was used to authenticate this note. -- Walter Dasilva PA-C     This is the Subsequent Medicare Annual Wellness Exam, performed 12 months or more after the Initial AWV or the last Subsequent AWV    I have reviewed the patient's medical history in detail and updated the computerized patient record. Assessment/Plan   Education and counseling provided:  Are appropriate based on today's review and evaluation  Pneumococcal Vaccine  Influenza Vaccine  Screening Mammography  Screening Pap and pelvic (covered once every 2 years)  Bone mass measurement (DEXA)    1. Medicare annual wellness visit, subsequent  Up to date on all vaccines except maybe tdap and she will check on that   Dexa up to date   She has advanced direction, requested she bring by office     2. Tinnitus of both ears  Chronic, seems to be worsening in last few years   No abnormalities on exam, no cerumen impaction.  Refer to ENT   -     REFERRAL TO ENT-OTOLARYNGOLOGY      Depression Risk Factor Screening     3 most recent PHQ Screens 1/9/2023   Little interest or pleasure in doing things Not at all   Feeling down, depressed, irritable, or hopeless Not at all   Total Score PHQ 2 0   Trouble falling or staying asleep, or sleeping too much -   Feeling tired or having little energy -   Poor appetite, weight loss, or overeating -   Feeling bad about yourself - or that you are a failure or have let yourself or your family down -   Trouble concentrating on things such as school, work, reading, or watching TV -   Moving or speaking so slowly that other people could have noticed; or the opposite being so fidgety that others notice -   Thoughts of being better off dead, or hurting yourself in some way -   PHQ 9 Score -   How difficult have these problems made it for you to do your work, take care of your home and get along with others -       Alcohol & Drug Abuse Risk Screen   Do you average more than 1 drink per night or more than 7 drinks a week?: (P) No  On any one occasion in the past three months have you had more than 3 drinks containing alcohol?: (P) No            Functional Ability and Level of Safety   Hearing:  Hearing: (P) Patient reports hearing is good, additional comments below  Hearing comments: (P) The ringing in my ear seems to be more noticeable.       -she has always had tinittus but now it is really ringing a little more. She always had earwax issue     Activities of Daily Living: The home contains: (P) no safety equipment  Functional ADLs: (P) Patient does total self care     Ambulation:  Patient ambulates: (P) with no difficulty     Fall Risk:  Fall Risk Assessment, last 12 mths 1/9/2023   Able to walk? Yes   Fall in past 12 months? 0   Do you feel unsteady?  0   Are you worried about falling 0     Abuse Screen:  Do you ever feel afraid of your partner?: (P) No  Are you in a relationship with someone who physically or mentally threatens you?: (P) No  Is it safe for you to go home?: (P) Yes        Cognitive Screening   Has your family/caregiver stated any concerns about your memory?: (P) No     Cognitive Screening: Normal - Clock Drawing Test    Health Maintenance Due     Health Maintenance Due   Topic Date Due    DTaP/Tdap/Td series (1 - Tdap) Never done    Shingles Vaccine (1 of 2) Never done    Breast Cancer Screen Mammogram  Never done    Pneumococcal 65+ years (2 - PPSV23 if available, else PCV20) 10/24/2020    Flu Vaccine (1) Never done    Medicare Yearly Exam  Never done    COVID-19 Vaccine (5 - Booster for [place] series) 09/06/2022       Patient Care Team   Patient Care Team:  Darien Nicholas as PCP - General (Physician Assistant)  Darien Nicholas as PCP - [facility] Empaneled Provider    History     Patient Active Problem List   Diagnosis Code    History of DVT (deep vein thrombosis) Z86.718    Primary osteoarthritis involving multiple joints M15.9    Fatty liver K76.0    History of partial colectomy Z90.49    Osteoporosis without current pathological fracture M81.0    Renal cyst N28.1    Benign essential microscopic hematuria R31.1    Depression F32. A     Past Medical History:   Diagnosis Date    Arthritis     H/O colonoscopy 11/03/2021      Past Surgical History:   Procedure Laterality Date    HX BACK SURGERY      HX CATARACT REMOVAL Bilateral     HX COLONOSCOPY      HX HERNIA REPAIR      HX ILEOSTOMY      HX KNEE REPLACEMENT Right      Current Outpatient Medications   Medication Sig Dispense Refill    buPROPion XL (WELLBUTRIN XL) 300 mg XL tablet Take 1 Tablet by mouth daily. 90 Tablet 0    psyllium husk (METAMUCIL PO) Take  by mouth.      cranberry fruit extract (CRANBERRY CONCENTRATE PO) Take  by mouth.      elderberry fruit (ELDERBERRY PO) Take  by mouth.      multivitamin (ONE A DAY) tablet Take 1 Tablet by mouth daily. mirabegron ER (MYRBETRIQ) 50 mg ER tablet Take 1 Tablet by mouth daily. 90 Tablet 1    ASPIRIN PO Take  by mouth.      meloxicam (MOBIC) 15 mg tablet Take 15 mg by mouth every other day. alendronate (FOSAMAX) 35 mg tablet Take  by mouth every seven (7) days.        No Known Allergies    Family History   Problem Relation Age of Onset    Lung Cancer Mother     Colon Cancer Father     Liver Cancer Father      Social History     Tobacco Use    Smoking status: Never    Smokeless tobacco: Never   Substance Use Topics    Alcohol use:  Yes     Alcohol/week: 2.0 standard drinks     Types: 1 Glasses of wine, 1 Cans of beer per week     Comment: occassional         Kristi Tabor PA-C

## 2023-01-09 NOTE — PATIENT INSTRUCTIONS
[facility]: [phone]    [facility]: [phone]

## 2023-01-09 NOTE — PROGRESS NOTES
Rinku James is a 75 y.o. female , id x 2(name and ). Reviewed record, history, and  medications. Chief Complaint   Patient presents with    Annual Wellness Visit    Medication Refill     Wellbutrin. Aphagia     Pt reports has been having difficulty swallowing off and on for the past few years. Doesn't happen often but when it does occur it has worsened. Has some drainage and usually has to think before she swallows. Other     Great toe on (R) foot has been discolored since 2022. Vitals:    23 0943   BP: 121/76   Pulse: 67   Resp: 16   Temp: 97.9 °F (36.6 °C)   TempSrc: Oral   SpO2: 98%   Weight: 231 lb 14.4 oz (105.2 kg)   Height: 5' 4\" (1.626 m)       Coordination of Care Questionnaire:   1. Have you been to the ER, urgent care clinic since your last visit? Hospitalized since your last visit? No    2. Have you seen or consulted any other health care providers outside of the [address] [place] [address] since your last visit? Include any pap smears or colon screening. Yes [facility]          Social Determinants of Health     Tobacco Use: Low Risk     Smoking Tobacco Use: Never    Smokeless Tobacco Use: Never    Passive Exposure: Not on file   Alcohol Use: Not on file   Financial Resource Strain: Low Risk     Difficulty of Paying Living Expenses: Not hard at all   Food Insecurity: No Food Insecurity    Worried About Running Out of Food in the Last Year: Never true    Ran Out of Food in the Last Year: Never true   Transportation Needs: No Transportation Needs    Lack of Transportation (Medical): No    Lack of Transportation (Non-Medical):  No   Physical Activity: Not on file   Stress: Not on file   Social Connections: Not on file   Intimate Partner Violence: Not on file   Depression: Not at risk    PHQ-2 Score: 0   Housing Stability: Low Risk     Unable to Pay for Housing in the Last Year: No    Number of Places Lived in the Last Year: 1    Unstable Housing in the Last Year: No

## 2023-01-09 NOTE — ACP (ADVANCE CARE PLANNING)
Advance Care Planning     General Advance Care Planning (ACP) Conversation      Date of Conversation: 1/9/2023  Conducted with: Patient with Decision Making Capacity    Healthcare Decision Maker:     Primary Decision Maker: Junie Holguin - Spouse - [phone]  Click here to complete [place] including selection of the Healthcare Decision Maker Relationship (ie \"Primary\")    Today we documented Decision Maker(s) consistent with Legal Next of Kin hierarchy. Content/Action Overview:    Has ACP document(s) NOT on file - requested patient to provide      Length of Voluntary ACP Conversation in minutes:  <16 minutes (Non-Billable)    Ramon Greer PA-C

## 2023-01-17 DIAGNOSIS — F32.A DEPRESSION, UNSPECIFIED DEPRESSION TYPE: ICD-10-CM

## 2023-01-18 RX ORDER — BUPROPION HYDROCHLORIDE 300 MG/1
TABLET ORAL
Qty: 90 TABLET | Refills: 3 | Status: SHIPPED | OUTPATIENT
Start: 2023-01-18

## 2023-02-14 ENCOUNTER — DOCUMENTATION ONLY (OUTPATIENT)
Dept: FAMILY MEDICINE CLINIC | Age: 68
End: 2023-02-14

## 2023-02-17 PROBLEM — D12.3 ADENOMATOUS POLYP OF TRANSVERSE COLON: Status: ACTIVE | Noted: 2023-02-17

## 2023-03-22 DIAGNOSIS — N39.41 URGE INCONTINENCE OF URINE: ICD-10-CM

## 2023-03-24 ENCOUNTER — OFFICE VISIT (OUTPATIENT)
Dept: FAMILY MEDICINE CLINIC | Age: 68
End: 2023-03-24

## 2023-03-24 VITALS
OXYGEN SATURATION: 96 % | TEMPERATURE: 97.2 F | HEIGHT: 64 IN | SYSTOLIC BLOOD PRESSURE: 112 MMHG | BODY MASS INDEX: 39.35 KG/M2 | HEART RATE: 73 BPM | WEIGHT: 230.5 LBS | DIASTOLIC BLOOD PRESSURE: 72 MMHG

## 2023-03-24 DIAGNOSIS — R42 ORTHOSTATIC LIGHTHEADEDNESS: ICD-10-CM

## 2023-03-24 DIAGNOSIS — Z92.89 HISTORY OF EKG: ICD-10-CM

## 2023-03-24 DIAGNOSIS — M25.511 CHRONIC RIGHT SHOULDER PAIN: Primary | ICD-10-CM

## 2023-03-24 DIAGNOSIS — G89.29 CHRONIC RIGHT SHOULDER PAIN: Primary | ICD-10-CM

## 2023-03-24 RX ORDER — TIZANIDINE 2 MG/1
2-4 TABLET ORAL
Qty: 30 TABLET | Refills: 1 | Status: SHIPPED | OUTPATIENT
Start: 2023-03-24

## 2023-03-24 NOTE — PROGRESS NOTES
Progress Note    she is a 75y.o. year old female who presents for evalution. Assessment/ Plan:   Diagnoses and all orders for this visit:    1. Chronic right shoulder pain  Reviewed Mobic use with mild renal insufficiency. Currently 7.5 mg every other day, may increase to daily. -     tiZANidine (ZANAFLEX) 2 mg tablet; Take 1-2 Tablets by mouth three (3) times daily as needed for Muscle Spasm(s) or Pain.  -     REFERRAL TO PHYSICAL THERAPY  -     REFERRAL TO ORTHOPEDICS    2. Orthostatic lightheadedness  No current blood pressure medication  Reviewed coping strategies for orthostatic lightheadedness    3. History of EKG  [facility] at [facility], reading incomplete RBBB and LAFB. Reassurance provided. Will be scanned into patient's chart        Follow-up and Dispositions    Return if symptoms worsen or fail to improve. I have discussed the diagnosis with the patient and the intended plan as seen in the above orders. The patient has received an after-visit summary and questions were answered concerning future plans. Pt conveyed understanding of plan. Medication Side Effects and Warnings were discussed with patient        Subjective:     Chief Complaint   Patient presents with    Shoulder Pain     2/18 she woke up with pain and within an hour the pain was so bad that she could not move the arm. She went to [facility] and was dx with (R) shoulder bursitis and given Pred. Also had a chest XR and EKG. Pain is in the upper (R) quad, describes as constant achy sensation at times radiates down and around to her flank near her breast     Abnormal EKG     Has question about EKG from [facility].      Shoulder pain is still there, now 5-6 weeks. She feels like it is increasing. She has been doing yoga every other day 15-20 minutes. Meloxicam was halved. Was doing 15 mg every other day. Currently 7.5 mg every other day.       Reviewed PmHx, RxHx, FmHx, SocHx, AllgHx and updated and dated in the chart. Review of Systems - negative except as listed above in the HPI    Objective:     Vitals:    03/24/23 0918   BP: 112/72   Pulse: 73   Temp: 97.2 °F (36.2 °C)   SpO2: 96%   Weight: 230 lb 8 oz (104.6 kg)   Height: 5' 4\" (1.626 m)       Current Outpatient Medications   Medication Sig    tiZANidine (ZANAFLEX) 2 mg tablet Take 1-2 Tablets by mouth three (3) times daily as needed for Muscle Spasm(s) or Pain.    mirabegron ER (MYRBETRIQ) 50 mg ER tablet Take 1 Tablet by mouth daily. buPROPion XL (WELLBUTRIN XL) 300 mg XL tablet TAKE 1 TABLET DAILY    psyllium husk (METAMUCIL PO) Take  by mouth.    cranberry fruit extract (CRANBERRY CONCENTRATE PO) Take  by mouth.    elderberry fruit (ELDERBERRY PO) Take  by mouth.    multivitamin (ONE A DAY) tablet Take 1 Tablet by mouth daily. ASPIRIN PO Take 81 mg by mouth every other day. meloxicam (MOBIC) 15 mg tablet Take 15 mg by mouth every other day. alendronate (FOSAMAX) 35 mg tablet Take  by mouth every seven (7) days. No current facility-administered medications for this visit. Physical Exam  Vitals and nursing note reviewed. Constitutional:       General: She is not in acute distress. Appearance: Normal appearance. She is not ill-appearing, toxic-appearing or diaphoretic. HENT:      Head: Normocephalic and atraumatic. Eyes:      General: No scleral icterus. Right eye: No discharge. Left eye: No discharge. Conjunctiva/sclera: Conjunctivae normal.   Pulmonary:      Effort: Pulmonary effort is normal. No respiratory distress. Musculoskeletal:         General: Tenderness (Over right scapula and medial border) present. No swelling or deformity. Normal range of motion. Cervical back: No rigidity. Skin:     General: Skin is warm and dry. Coloration: Skin is not jaundiced. Findings: No bruising or rash (over visualized areas). Neurological:      General: No focal deficit present. Mental Status: She is alert. Sensory: No sensory deficit. Motor: No weakness. Psychiatric:         Mood and Affect: Mood normal.         Behavior: Behavior normal.         Thought Content:  Thought content normal.         Judgment: Judgment normal.            Ad Ewing MD

## 2023-03-24 NOTE — PROGRESS NOTES
Nany Pritchard is a 75 y.o. female , id x 2(name and ). Reviewed record, history, and  medications. Chief Complaint   Patient presents with    Shoulder Pain      she woke up with pain and within an hour the pain was so bad that she could not move the arm. She went to [facility] and was dx with (R) shoulder bursitis and given Pred. Also had a chest XR and EKG. Pain is in the upper (R) quad, describes as constant achy sensation at times radiates down and around to her flank near her breast     Abnormal EKG     Has question about EKG from [facility].        Vitals:    23 0918   BP: 112/72   Pulse: 73   Temp: 97.2 °F (36.2 °C)   SpO2: 96%   Weight: 230 lb 8 oz (104.6 kg)   Height: 5' 4\" (1.626 m)       Coordination of Care Questionnaire:   1. Have you been to the ER, urgent care clinic since your last visit? Hospitalized since your last visit? See visit reason    2. Have you seen or consulted any other health care providers outside of the [address] [place] [address] since your last visit? Include any pap smears or colon screening.  No      3 most recent PHQ Screens 3/24/2023   Little interest or pleasure in doing things Not at all   Feeling down, depressed, irritable, or hopeless Not at all   Total Score PHQ 2 0   Trouble falling or staying asleep, or sleeping too much -   Feeling tired or having little energy -   Poor appetite, weight loss, or overeating -   Feeling bad about yourself - or that you are a failure or have let yourself or your family down -   Trouble concentrating on things such as school, work, reading, or watching TV -   Moving or speaking so slowly that other people could have noticed; or the opposite being so fidgety that others notice -   Thoughts of being better off dead, or hurting yourself in some way -   PHQ 9 Score -   How difficult have these problems made it for you to do your work, take care of your home and get along with others -       Social Determinants of Health Tobacco Use: Low Risk     Smoking Tobacco Use: Never    Smokeless Tobacco Use: Never    Passive Exposure: Not on file   Alcohol Use: Not on file   Financial Resource Strain: Low Risk     Difficulty of Paying Living Expenses: Not hard at all   Food Insecurity: No Food Insecurity    Worried About Running Out of Food in the Last Year: Never true    Ran Out of Food in the Last Year: Never true   Transportation Needs: No Transportation Needs    Lack of Transportation (Medical): No    Lack of Transportation (Non-Medical): No   Physical Activity: Not on file   Stress: Not on file   Social Connections: Not on file   Intimate Partner Violence: Not on file   Depression: Not at risk    PHQ-2 Score: 0   Housing Stability: Low Risk     Unable to Pay for Housing in the Last Year: No    Number of Places Lived in the Last Year: 1    Unstable Housing in the Last Year: No       Patient is accompanied by self I have received verbal consent from Jean Pierre Workman to discuss any/all medical information while they are present in the room.

## 2023-03-24 NOTE — PATIENT INSTRUCTIONS
Try a heating pad on the shoulder, especially before stretching    Try muscle relaxant, especially before bed

## 2023-04-12 ENCOUNTER — HOSPITAL ENCOUNTER (OUTPATIENT)
Dept: PHYSICAL THERAPY | Age: 68
Discharge: HOME | End: 2023-04-12
Payer: MEDICARE

## 2023-04-12 PROCEDURE — 97161 PT EVAL LOW COMPLEX 20 MIN: CPT

## 2023-04-12 PROCEDURE — 97110 THERAPEUTIC EXERCISES: CPT

## 2023-07-11 ENCOUNTER — CLINICAL DOCUMENTATION (OUTPATIENT)
Age: 68
End: 2023-07-11

## 2023-08-30 ENCOUNTER — OFFICE VISIT (OUTPATIENT)
Age: 68
End: 2023-08-30
Payer: MEDICARE

## 2023-08-30 VITALS
WEIGHT: 228.4 LBS | OXYGEN SATURATION: 97 % | HEART RATE: 74 BPM | HEIGHT: 64 IN | SYSTOLIC BLOOD PRESSURE: 124 MMHG | DIASTOLIC BLOOD PRESSURE: 71 MMHG | TEMPERATURE: 98.3 F | BODY MASS INDEX: 38.99 KG/M2

## 2023-08-30 DIAGNOSIS — M79.89 LOCALIZED SWELLING OF BOTH LOWER EXTREMITIES: ICD-10-CM

## 2023-08-30 DIAGNOSIS — M81.0 AGE-RELATED OSTEOPOROSIS WITHOUT CURRENT PATHOLOGICAL FRACTURE: ICD-10-CM

## 2023-08-30 DIAGNOSIS — N28.9 IMPAIRED RENAL FUNCTION: ICD-10-CM

## 2023-08-30 DIAGNOSIS — E66.9 CLASS 2 OBESITY WITH BODY MASS INDEX (BMI) OF 39.0 TO 39.9 IN ADULT, UNSPECIFIED OBESITY TYPE, UNSPECIFIED WHETHER SERIOUS COMORBIDITY PRESENT: ICD-10-CM

## 2023-08-30 DIAGNOSIS — R13.10 DYSPHAGIA, UNSPECIFIED TYPE: Primary | ICD-10-CM

## 2023-08-30 LAB
ALBUMIN SERPL-MCNC: 4 G/DL (ref 3.5–5)
ALBUMIN/GLOB SERPL: 1.4 (ref 1.1–2.2)
ALP SERPL-CCNC: 103 U/L (ref 45–117)
ALT SERPL-CCNC: 23 U/L (ref 12–78)
ANION GAP SERPL CALC-SCNC: 1 MMOL/L (ref 5–15)
APPEARANCE UR: CLEAR
AST SERPL-CCNC: 18 U/L (ref 15–37)
BACTERIA URNS QL MICRO: NEGATIVE /HPF
BILIRUB SERPL-MCNC: 0.9 MG/DL (ref 0.2–1)
BILIRUB UR QL: NEGATIVE
BUN SERPL-MCNC: 19 MG/DL (ref 6–20)
BUN/CREAT SERPL: 16 (ref 12–20)
CALCIUM SERPL-MCNC: 10.1 MG/DL (ref 8.5–10.1)
CAOX CRY URNS QL MICRO: ABNORMAL
CHLORIDE SERPL-SCNC: 109 MMOL/L (ref 97–108)
CHOLEST SERPL-MCNC: 228 MG/DL
CO2 SERPL-SCNC: 28 MMOL/L (ref 21–32)
COLOR UR: ABNORMAL
CREAT SERPL-MCNC: 1.21 MG/DL (ref 0.55–1.02)
CREAT UR-MCNC: 166 MG/DL
EPITH CASTS URNS QL MICRO: ABNORMAL /LPF
ERYTHROCYTE [DISTWIDTH] IN BLOOD BY AUTOMATED COUNT: 15 % (ref 11.5–14.5)
GLOBULIN SER CALC-MCNC: 2.9 G/DL (ref 2–4)
GLUCOSE SERPL-MCNC: 97 MG/DL (ref 65–100)
GLUCOSE UR STRIP.AUTO-MCNC: NEGATIVE MG/DL
HCT VFR BLD AUTO: 38 % (ref 35–47)
HDLC SERPL-MCNC: 78 MG/DL
HDLC SERPL: 2.9 (ref 0–5)
HGB BLD-MCNC: 11.9 G/DL (ref 11.5–16)
HGB UR QL STRIP: ABNORMAL
KETONES UR QL STRIP.AUTO: NEGATIVE MG/DL
LDLC SERPL CALC-MCNC: 123 MG/DL (ref 0–100)
LEUKOCYTE ESTERASE UR QL STRIP.AUTO: ABNORMAL
MCH RBC QN AUTO: 30.5 PG (ref 26–34)
MCHC RBC AUTO-ENTMCNC: 31.3 G/DL (ref 30–36.5)
MCV RBC AUTO: 97.4 FL (ref 80–99)
MICROALBUMIN UR-MCNC: 1.06 MG/DL
MICROALBUMIN/CREAT UR-RTO: 6 MG/G (ref 0–30)
NITRITE UR QL STRIP.AUTO: NEGATIVE
NRBC # BLD: 0 K/UL (ref 0–0.01)
NRBC BLD-RTO: 0 PER 100 WBC
PH UR STRIP: 5 (ref 5–8)
PLATELET # BLD AUTO: 278 K/UL (ref 150–400)
PMV BLD AUTO: 9.7 FL (ref 8.9–12.9)
POTASSIUM SERPL-SCNC: 4.8 MMOL/L (ref 3.5–5.1)
PROT SERPL-MCNC: 6.9 G/DL (ref 6.4–8.2)
PROT UR STRIP-MCNC: NEGATIVE MG/DL
RBC # BLD AUTO: 3.9 M/UL (ref 3.8–5.2)
RBC #/AREA URNS HPF: ABNORMAL /HPF (ref 0–5)
SODIUM SERPL-SCNC: 138 MMOL/L (ref 136–145)
SP GR UR REFRACTOMETRY: 1.02 (ref 1–1.03)
TRIGL SERPL-MCNC: 135 MG/DL
TSH SERPL DL<=0.05 MIU/L-ACNC: 1.62 UIU/ML (ref 0.36–3.74)
UROBILINOGEN UR QL STRIP.AUTO: 0.2 EU/DL (ref 0.2–1)
VLDLC SERPL CALC-MCNC: 27 MG/DL
WBC # BLD AUTO: 5 K/UL (ref 3.6–11)
WBC URNS QL MICRO: ABNORMAL /HPF (ref 0–4)

## 2023-08-30 PROCEDURE — 1123F ACP DISCUSS/DSCN MKR DOCD: CPT | Performed by: STUDENT IN AN ORGANIZED HEALTH CARE EDUCATION/TRAINING PROGRAM

## 2023-08-30 PROCEDURE — 99214 OFFICE O/P EST MOD 30 MIN: CPT | Performed by: STUDENT IN AN ORGANIZED HEALTH CARE EDUCATION/TRAINING PROGRAM

## 2023-08-30 PROCEDURE — 1090F PRES/ABSN URINE INCON ASSESS: CPT | Performed by: STUDENT IN AN ORGANIZED HEALTH CARE EDUCATION/TRAINING PROGRAM

## 2023-08-30 PROCEDURE — G8417 CALC BMI ABV UP PARAM F/U: HCPCS | Performed by: STUDENT IN AN ORGANIZED HEALTH CARE EDUCATION/TRAINING PROGRAM

## 2023-08-30 PROCEDURE — G8427 DOCREV CUR MEDS BY ELIG CLIN: HCPCS | Performed by: STUDENT IN AN ORGANIZED HEALTH CARE EDUCATION/TRAINING PROGRAM

## 2023-08-30 PROCEDURE — 3017F COLORECTAL CA SCREEN DOC REV: CPT | Performed by: STUDENT IN AN ORGANIZED HEALTH CARE EDUCATION/TRAINING PROGRAM

## 2023-08-30 PROCEDURE — G8399 PT W/DXA RESULTS DOCUMENT: HCPCS | Performed by: STUDENT IN AN ORGANIZED HEALTH CARE EDUCATION/TRAINING PROGRAM

## 2023-08-30 PROCEDURE — 1036F TOBACCO NON-USER: CPT | Performed by: STUDENT IN AN ORGANIZED HEALTH CARE EDUCATION/TRAINING PROGRAM

## 2023-08-30 RX ORDER — ALENDRONATE SODIUM 35 MG/1
35 TABLET ORAL
Qty: 12 TABLET | Refills: 3 | Status: SHIPPED | OUTPATIENT
Start: 2023-08-30

## 2023-08-30 NOTE — PROGRESS NOTES
Progress Note    she is a 75y.o. year old female who presents for evaluation of Medication Check        Assessment/ Plan:     1. Dysphagia, unspecified type  Assessment & Plan:  Worsening, primarily an issue with saliva late evening/night  Previously with issues swallowing that resolved after endoscopy without dilation  Possibly due to stricture or functional dysphagia. Encouraged to have barium swallow done and follow-up with her gastroenterologist  Orders:  -     FL UPPER GI W BARIUM SWALLOW KUB; Future  -     TSH; Future  2. Age-related osteoporosis without current pathological fracture  -     alendronate (FOSAMAX) 35 MG tablet; Take 1 tablet by mouth every 7 days, Disp-12 tablet, R-3Normal  -     TSH; Future  -     Vitamin D 25 Hydroxy; Future  3. Class 2 obesity with body mass index (BMI) of 39.0 to 39.9 in adult, unspecified obesity type, unspecified whether serious comorbidity present  Assessment & Plan:  Encouraged to work on healthy eating habits and track oral intake, aim to keep carbs <100 grams to start. Encouraged to exercise, at least 150 minutes of moderate intensity cardio and 3 days of total body strength training in the week. Orders:  -     CBC; Future  -     Comprehensive Metabolic Panel; Future  -     Lipid Panel; Future  -     TSH; Future  4. Impaired renal function  -     Urinalysis with Microscopic; Future  -     Microalbumin / Creatinine Urine Ratio; Future  5. Localized swelling of both lower extremities  Assessment & Plan:  Encouraged low-salt, compression, elevation        Patient is fasting for labs today. Return in about 6 months (around 2/29/2024) for follow-up . I have discussed the diagnosis with the patient and the intended plan as seen in the above orders. The patient has received an after-visit summary and questions were answered concerning future plans. Pt conveyed understanding of plan.     Medication Side Effects and Warnings were discussed with

## 2023-08-31 PROBLEM — R13.10 DYSPHAGIA: Status: ACTIVE | Noted: 2023-08-31

## 2023-08-31 PROBLEM — N28.9 IMPAIRED RENAL FUNCTION: Status: ACTIVE | Noted: 2023-08-31

## 2023-08-31 PROBLEM — M79.89 LOCALIZED SWELLING OF BOTH LOWER EXTREMITIES: Status: ACTIVE | Noted: 2023-08-31

## 2023-08-31 PROBLEM — E66.9 CLASS 2 OBESITY WITH BODY MASS INDEX (BMI) OF 39.0 TO 39.9 IN ADULT: Status: ACTIVE | Noted: 2023-08-31

## 2023-08-31 PROBLEM — E66.812 CLASS 2 OBESITY WITH BODY MASS INDEX (BMI) OF 39.0 TO 39.9 IN ADULT: Status: ACTIVE | Noted: 2023-08-31

## 2023-08-31 LAB — 25(OH)D3 SERPL-MCNC: 20.9 NG/ML (ref 30–100)

## 2023-09-01 NOTE — ASSESSMENT & PLAN NOTE
Worsening, primarily an issue with saliva late evening/night  Previously with issues swallowing that resolved after endoscopy without dilation  Possibly due to stricture or functional dysphagia.   Encouraged to have barium swallow done and follow-up with her gastroenterologist

## 2023-09-01 NOTE — ASSESSMENT & PLAN NOTE
Encouraged to work on healthy eating habits and track oral intake, aim to keep carbs <100 grams to start. Encouraged to exercise, at least 150 minutes of moderate intensity cardio and 3 days of total body strength training in the week.

## 2023-09-04 DIAGNOSIS — N28.9 IMPAIRED RENAL FUNCTION: Primary | ICD-10-CM

## 2023-09-07 ENCOUNTER — HOSPITAL ENCOUNTER (OUTPATIENT)
Facility: HOSPITAL | Age: 68
Discharge: HOME | End: 2023-09-07
Attending: STUDENT IN AN ORGANIZED HEALTH CARE EDUCATION/TRAINING PROGRAM
Payer: MEDICARE

## 2023-09-07 DIAGNOSIS — R13.10 DYSPHAGIA, UNSPECIFIED TYPE: ICD-10-CM

## 2023-09-07 PROCEDURE — 6370000000 HC RX 637 (ALT 250 FOR IP): Performed by: STUDENT IN AN ORGANIZED HEALTH CARE EDUCATION/TRAINING PROGRAM

## 2023-09-07 PROCEDURE — 2500000003 HC RX 250 WO HCPCS: Performed by: STUDENT IN AN ORGANIZED HEALTH CARE EDUCATION/TRAINING PROGRAM

## 2023-09-07 PROCEDURE — 74240 X-RAY XM UPR GI TRC 1CNTRST: CPT

## 2023-09-07 RX ADMIN — BARIUM SULFATE 250 ML: 0.6 SUSPENSION ORAL at 10:03

## 2023-09-07 RX ADMIN — BARIUM SULFATE 140 ML: 980 POWDER, FOR SUSPENSION ORAL at 10:02

## 2023-09-07 RX ADMIN — BARIUM SULFATE 1 TABLET: 700 TABLET ORAL at 10:02

## 2023-09-07 RX ADMIN — ANTACID/ANTIFLATULENT 1 EACH: 380; 550; 10; 10 GRANULE, EFFERVESCENT ORAL at 10:02

## 2023-09-21 DIAGNOSIS — N28.9 IMPAIRED RENAL FUNCTION: ICD-10-CM

## 2023-09-22 LAB
ANION GAP SERPL CALC-SCNC: 5 MMOL/L (ref 5–15)
APPEARANCE UR: ABNORMAL
BACTERIA URNS QL MICRO: NEGATIVE /HPF
BILIRUB UR QL CFM: NEGATIVE
BUN SERPL-MCNC: 18 MG/DL (ref 6–20)
BUN/CREAT SERPL: 15 (ref 12–20)
CALCIUM SERPL-MCNC: 9.6 MG/DL (ref 8.5–10.1)
CAOX CRY URNS QL MICRO: ABNORMAL
CHLORIDE SERPL-SCNC: 110 MMOL/L (ref 97–108)
CO2 SERPL-SCNC: 26 MMOL/L (ref 21–32)
COLOR UR: ABNORMAL
CREAT SERPL-MCNC: 1.24 MG/DL (ref 0.55–1.02)
EPITH CASTS URNS QL MICRO: ABNORMAL /LPF
GLUCOSE SERPL-MCNC: 102 MG/DL (ref 65–100)
GLUCOSE UR STRIP.AUTO-MCNC: NEGATIVE MG/DL
HGB UR QL STRIP: ABNORMAL
KETONES UR QL STRIP.AUTO: ABNORMAL MG/DL
LEUKOCYTE ESTERASE UR QL STRIP.AUTO: ABNORMAL
NITRITE UR QL STRIP.AUTO: NEGATIVE
PH UR STRIP: 5 (ref 5–8)
POTASSIUM SERPL-SCNC: 4.6 MMOL/L (ref 3.5–5.1)
PROT UR STRIP-MCNC: ABNORMAL MG/DL
RBC #/AREA URNS HPF: ABNORMAL /HPF (ref 0–5)
SODIUM SERPL-SCNC: 141 MMOL/L (ref 136–145)
SP GR UR REFRACTOMETRY: 1.03 (ref 1–1.03)
UROBILINOGEN UR QL STRIP.AUTO: 0.2 EU/DL (ref 0.2–1)
WBC URNS QL MICRO: ABNORMAL /HPF (ref 0–4)

## 2023-09-24 PROBLEM — N18.31 STAGE 3A CHRONIC KIDNEY DISEASE (HCC): Status: ACTIVE | Noted: 2023-08-31

## 2023-09-29 ENCOUNTER — OFFICE VISIT (OUTPATIENT)
Age: 68
End: 2023-09-29
Payer: MEDICARE

## 2023-09-29 VITALS
WEIGHT: 226.8 LBS | HEIGHT: 64 IN | TEMPERATURE: 98.8 F | SYSTOLIC BLOOD PRESSURE: 120 MMHG | RESPIRATION RATE: 20 BRPM | BODY MASS INDEX: 38.72 KG/M2 | DIASTOLIC BLOOD PRESSURE: 79 MMHG | HEART RATE: 77 BPM | OXYGEN SATURATION: 97 %

## 2023-09-29 DIAGNOSIS — M54.2 CERVICAL PAIN (NECK): Primary | ICD-10-CM

## 2023-09-29 DIAGNOSIS — N18.31 STAGE 3A CHRONIC KIDNEY DISEASE (HCC): ICD-10-CM

## 2023-09-29 DIAGNOSIS — M54.9 UPPER BACK PAIN: ICD-10-CM

## 2023-09-29 PROCEDURE — 99213 OFFICE O/P EST LOW 20 MIN: CPT | Performed by: PHYSICIAN ASSISTANT

## 2023-09-29 PROCEDURE — 3017F COLORECTAL CA SCREEN DOC REV: CPT | Performed by: PHYSICIAN ASSISTANT

## 2023-09-29 PROCEDURE — 1090F PRES/ABSN URINE INCON ASSESS: CPT | Performed by: PHYSICIAN ASSISTANT

## 2023-09-29 PROCEDURE — G8417 CALC BMI ABV UP PARAM F/U: HCPCS | Performed by: PHYSICIAN ASSISTANT

## 2023-09-29 PROCEDURE — 1123F ACP DISCUSS/DSCN MKR DOCD: CPT | Performed by: PHYSICIAN ASSISTANT

## 2023-09-29 PROCEDURE — G8427 DOCREV CUR MEDS BY ELIG CLIN: HCPCS | Performed by: PHYSICIAN ASSISTANT

## 2023-09-29 PROCEDURE — G8399 PT W/DXA RESULTS DOCUMENT: HCPCS | Performed by: PHYSICIAN ASSISTANT

## 2023-09-29 PROCEDURE — 1036F TOBACCO NON-USER: CPT | Performed by: PHYSICIAN ASSISTANT

## 2023-09-29 RX ORDER — METHYLPREDNISOLONE 4 MG/1
TABLET ORAL
Qty: 21 TABLET | Refills: 0 | Status: SHIPPED | OUTPATIENT
Start: 2023-09-29 | End: 2023-10-05

## 2023-09-29 RX ORDER — TIZANIDINE 2 MG/1
2-4 TABLET ORAL 3 TIMES DAILY PRN
Qty: 30 TABLET | Refills: 0 | Status: SHIPPED | OUTPATIENT
Start: 2023-09-29

## 2023-09-29 ASSESSMENT — ANXIETY QUESTIONNAIRES
4. TROUBLE RELAXING: 0
3. WORRYING TOO MUCH ABOUT DIFFERENT THINGS: 0
5. BEING SO RESTLESS THAT IT IS HARD TO SIT STILL: 0
1. FEELING NERVOUS, ANXIOUS, OR ON EDGE: 0
IF YOU CHECKED OFF ANY PROBLEMS ON THIS QUESTIONNAIRE, HOW DIFFICULT HAVE THESE PROBLEMS MADE IT FOR YOU TO DO YOUR WORK, TAKE CARE OF THINGS AT HOME, OR GET ALONG WITH OTHER PEOPLE: NOT DIFFICULT AT ALL
6. BECOMING EASILY ANNOYED OR IRRITABLE: 0
7. FEELING AFRAID AS IF SOMETHING AWFUL MIGHT HAPPEN: 0
2. NOT BEING ABLE TO STOP OR CONTROL WORRYING: 0
GAD7 TOTAL SCORE: 0

## 2023-09-29 ASSESSMENT — PATIENT HEALTH QUESTIONNAIRE - PHQ9
4. FEELING TIRED OR HAVING LITTLE ENERGY: 0
5. POOR APPETITE OR OVEREATING: 0
SUM OF ALL RESPONSES TO PHQ9 QUESTIONS 1 & 2: 0
SUM OF ALL RESPONSES TO PHQ QUESTIONS 1-9: 0
8. MOVING OR SPEAKING SO SLOWLY THAT OTHER PEOPLE COULD HAVE NOTICED. OR THE OPPOSITE, BEING SO FIGETY OR RESTLESS THAT YOU HAVE BEEN MOVING AROUND A LOT MORE THAN USUAL: 0
SUM OF ALL RESPONSES TO PHQ QUESTIONS 1-9: 0
6. FEELING BAD ABOUT YOURSELF - OR THAT YOU ARE A FAILURE OR HAVE LET YOURSELF OR YOUR FAMILY DOWN: 0
SUM OF ALL RESPONSES TO PHQ QUESTIONS 1-9: 0
10. IF YOU CHECKED OFF ANY PROBLEMS, HOW DIFFICULT HAVE THESE PROBLEMS MADE IT FOR YOU TO DO YOUR WORK, TAKE CARE OF THINGS AT HOME, OR GET ALONG WITH OTHER PEOPLE: 0
7. TROUBLE CONCENTRATING ON THINGS, SUCH AS READING THE NEWSPAPER OR WATCHING TELEVISION: 0
9. THOUGHTS THAT YOU WOULD BE BETTER OFF DEAD, OR OF HURTING YOURSELF: 0
1. LITTLE INTEREST OR PLEASURE IN DOING THINGS: 0
2. FEELING DOWN, DEPRESSED OR HOPELESS: 0
SUM OF ALL RESPONSES TO PHQ QUESTIONS 1-9: 0
3. TROUBLE FALLING OR STAYING ASLEEP: 0

## 2023-10-03 ENCOUNTER — HOSPITAL ENCOUNTER (OUTPATIENT)
Facility: HOSPITAL | Age: 68
Discharge: HOME | End: 2023-10-06
Payer: MEDICARE

## 2023-10-03 DIAGNOSIS — M54.9 UPPER BACK PAIN: ICD-10-CM

## 2023-10-03 DIAGNOSIS — M54.2 CERVICAL PAIN (NECK): ICD-10-CM

## 2023-10-03 PROCEDURE — 72050 X-RAY EXAM NECK SPINE 4/5VWS: CPT

## 2023-10-03 PROCEDURE — 72072 X-RAY EXAM THORAC SPINE 3VWS: CPT

## 2024-01-16 RX ORDER — MIRABEGRON 50 MG/1
50 TABLET, FILM COATED, EXTENDED RELEASE ORAL DAILY
Qty: 90 TABLET | Refills: 3 | Status: SHIPPED | OUTPATIENT
Start: 2024-01-16

## 2024-01-31 ENCOUNTER — TELEPHONE (OUTPATIENT)
Age: 69
End: 2024-01-31

## 2024-01-31 DIAGNOSIS — F32.A DEPRESSION, UNSPECIFIED DEPRESSION TYPE: Primary | ICD-10-CM

## 2024-01-31 NOTE — TELEPHONE ENCOUNTER
We received a fax refill request for [facility].  Please escribe buPROPion (WELLBUTRIN XL) 300 MG extended release tablet  to their pharmacy.  The pharmacy is correct in the chart and they are requesting a 90 day supply.

## 2024-02-01 RX ORDER — BUPROPION HYDROCHLORIDE 300 MG/1
300 TABLET ORAL DAILY
Qty: 90 TABLET | Refills: 0 | Status: SHIPPED | OUTPATIENT
Start: 2024-02-01

## 2024-03-01 ENCOUNTER — OFFICE VISIT (OUTPATIENT)
Age: 69
End: 2024-03-01
Payer: MEDICARE

## 2024-03-01 VITALS
HEART RATE: 77 BPM | DIASTOLIC BLOOD PRESSURE: 80 MMHG | OXYGEN SATURATION: 98 % | TEMPERATURE: 97.8 F | WEIGHT: 224 LBS | RESPIRATION RATE: 18 BRPM | HEIGHT: 64 IN | BODY MASS INDEX: 38.24 KG/M2 | SYSTOLIC BLOOD PRESSURE: 126 MMHG

## 2024-03-01 DIAGNOSIS — E66.9 CLASS 2 OBESITY WITH BODY MASS INDEX (BMI) OF 39.0 TO 39.9 IN ADULT, UNSPECIFIED OBESITY TYPE, UNSPECIFIED WHETHER SERIOUS COMORBIDITY PRESENT: ICD-10-CM

## 2024-03-01 DIAGNOSIS — Z12.31 ENCOUNTER FOR SCREENING MAMMOGRAM FOR MALIGNANT NEOPLASM OF BREAST: ICD-10-CM

## 2024-03-01 DIAGNOSIS — N18.31 STAGE 3A CHRONIC KIDNEY DISEASE (HCC): ICD-10-CM

## 2024-03-01 DIAGNOSIS — M81.0 AGE-RELATED OSTEOPOROSIS WITHOUT CURRENT PATHOLOGICAL FRACTURE: ICD-10-CM

## 2024-03-01 DIAGNOSIS — F33.0 MAJOR DEPRESSIVE DISORDER, RECURRENT, MILD (HCC): ICD-10-CM

## 2024-03-01 DIAGNOSIS — F33.0 MAJOR DEPRESSIVE DISORDER, RECURRENT, MILD (HCC): Primary | ICD-10-CM

## 2024-03-01 LAB
ALBUMIN SERPL-MCNC: 4.1 G/DL (ref 3.5–5)
ALBUMIN/GLOB SERPL: 1.3 (ref 1.1–2.2)
ALP SERPL-CCNC: 100 U/L (ref 45–117)
ALT SERPL-CCNC: 22 U/L (ref 12–78)
ANION GAP SERPL CALC-SCNC: 1 MMOL/L (ref 5–15)
AST SERPL-CCNC: 15 U/L (ref 15–37)
BASOPHILS # BLD: 0.1 K/UL (ref 0–0.1)
BASOPHILS NFR BLD: 1 % (ref 0–1)
BILIRUB SERPL-MCNC: 1 MG/DL (ref 0.2–1)
BUN SERPL-MCNC: 16 MG/DL (ref 6–20)
BUN/CREAT SERPL: 13 (ref 12–20)
CALCIUM SERPL-MCNC: 10.2 MG/DL (ref 8.5–10.1)
CHLORIDE SERPL-SCNC: 107 MMOL/L (ref 97–108)
CHOLEST SERPL-MCNC: 225 MG/DL
CO2 SERPL-SCNC: 29 MMOL/L (ref 21–32)
CREAT SERPL-MCNC: 1.23 MG/DL (ref 0.55–1.02)
CREAT UR-MCNC: 129 MG/DL
DIFFERENTIAL METHOD BLD: NORMAL
EOSINOPHIL # BLD: 0.1 K/UL (ref 0–0.4)
EOSINOPHIL NFR BLD: 2 % (ref 0–7)
ERYTHROCYTE [DISTWIDTH] IN BLOOD BY AUTOMATED COUNT: 13.7 % (ref 11.5–14.5)
EST. AVERAGE GLUCOSE BLD GHB EST-MCNC: 105 MG/DL
GLOBULIN SER CALC-MCNC: 3.1 G/DL (ref 2–4)
GLUCOSE SERPL-MCNC: 100 MG/DL (ref 65–100)
HBA1C MFR BLD: 5.3 % (ref 4–5.6)
HCT VFR BLD AUTO: 41.9 % (ref 35–47)
HDLC SERPL-MCNC: 64 MG/DL
HDLC SERPL: 3.5 (ref 0–5)
HGB BLD-MCNC: 14 G/DL (ref 11.5–16)
IMM GRANULOCYTES # BLD AUTO: 0 K/UL (ref 0–0.04)
IMM GRANULOCYTES NFR BLD AUTO: 0 % (ref 0–0.5)
LDLC SERPL CALC-MCNC: 135.8 MG/DL (ref 0–100)
LYMPHOCYTES # BLD: 1.3 K/UL (ref 0.8–3.5)
LYMPHOCYTES NFR BLD: 30 % (ref 12–49)
MCH RBC QN AUTO: 32.1 PG (ref 26–34)
MCHC RBC AUTO-ENTMCNC: 33.4 G/DL (ref 30–36.5)
MCV RBC AUTO: 96.1 FL (ref 80–99)
MICROALBUMIN UR-MCNC: 1.48 MG/DL
MICROALBUMIN/CREAT UR-RTO: 11 MG/G (ref 0–30)
MONOCYTES # BLD: 0.5 K/UL (ref 0–1)
MONOCYTES NFR BLD: 11 % (ref 5–13)
NEUTS SEG # BLD: 2.5 K/UL (ref 1.8–8)
NEUTS SEG NFR BLD: 56 % (ref 32–75)
NRBC # BLD: 0 K/UL (ref 0–0.01)
NRBC BLD-RTO: 0 PER 100 WBC
PLATELET # BLD AUTO: 243 K/UL (ref 150–400)
PMV BLD AUTO: 9.6 FL (ref 8.9–12.9)
POTASSIUM SERPL-SCNC: 4.7 MMOL/L (ref 3.5–5.1)
PROT SERPL-MCNC: 7.2 G/DL (ref 6.4–8.2)
RBC # BLD AUTO: 4.36 M/UL (ref 3.8–5.2)
SODIUM SERPL-SCNC: 137 MMOL/L (ref 136–145)
TRIGL SERPL-MCNC: 126 MG/DL
TSH SERPL DL<=0.05 MIU/L-ACNC: 2.04 UIU/ML (ref 0.36–3.74)
VLDLC SERPL CALC-MCNC: 25.2 MG/DL
WBC # BLD AUTO: 4.4 K/UL (ref 3.6–11)

## 2024-03-01 PROCEDURE — G8427 DOCREV CUR MEDS BY ELIG CLIN: HCPCS | Performed by: STUDENT IN AN ORGANIZED HEALTH CARE EDUCATION/TRAINING PROGRAM

## 2024-03-01 PROCEDURE — 3017F COLORECTAL CA SCREEN DOC REV: CPT | Performed by: STUDENT IN AN ORGANIZED HEALTH CARE EDUCATION/TRAINING PROGRAM

## 2024-03-01 PROCEDURE — 1036F TOBACCO NON-USER: CPT | Performed by: STUDENT IN AN ORGANIZED HEALTH CARE EDUCATION/TRAINING PROGRAM

## 2024-03-01 PROCEDURE — 1090F PRES/ABSN URINE INCON ASSESS: CPT | Performed by: STUDENT IN AN ORGANIZED HEALTH CARE EDUCATION/TRAINING PROGRAM

## 2024-03-01 PROCEDURE — G8484 FLU IMMUNIZE NO ADMIN: HCPCS | Performed by: STUDENT IN AN ORGANIZED HEALTH CARE EDUCATION/TRAINING PROGRAM

## 2024-03-01 PROCEDURE — 99214 OFFICE O/P EST MOD 30 MIN: CPT | Performed by: STUDENT IN AN ORGANIZED HEALTH CARE EDUCATION/TRAINING PROGRAM

## 2024-03-01 PROCEDURE — G8399 PT W/DXA RESULTS DOCUMENT: HCPCS | Performed by: STUDENT IN AN ORGANIZED HEALTH CARE EDUCATION/TRAINING PROGRAM

## 2024-03-01 PROCEDURE — 1123F ACP DISCUSS/DSCN MKR DOCD: CPT | Performed by: STUDENT IN AN ORGANIZED HEALTH CARE EDUCATION/TRAINING PROGRAM

## 2024-03-01 PROCEDURE — G8417 CALC BMI ABV UP PARAM F/U: HCPCS | Performed by: STUDENT IN AN ORGANIZED HEALTH CARE EDUCATION/TRAINING PROGRAM

## 2024-03-01 RX ORDER — BUPROPION HYDROCHLORIDE 300 MG/1
300 TABLET ORAL DAILY
Qty: 90 TABLET | Refills: 1 | Status: SHIPPED | OUTPATIENT
Start: 2024-03-01

## 2024-03-01 RX ORDER — ALENDRONATE SODIUM 35 MG/1
35 TABLET ORAL
Qty: 12 TABLET | Refills: 3 | Status: SHIPPED | OUTPATIENT
Start: 2024-03-01

## 2024-03-01 RX ORDER — BUPROPION HYDROCHLORIDE 150 MG/1
150 TABLET ORAL EVERY MORNING
Qty: 90 TABLET | Refills: 1 | Status: SHIPPED | OUTPATIENT
Start: 2024-03-01

## 2024-03-01 ASSESSMENT — PATIENT HEALTH QUESTIONNAIRE - PHQ9
10. IF YOU CHECKED OFF ANY PROBLEMS, HOW DIFFICULT HAVE THESE PROBLEMS MADE IT FOR YOU TO DO YOUR WORK, TAKE CARE OF THINGS AT HOME, OR GET ALONG WITH OTHER PEOPLE: 1
6. FEELING BAD ABOUT YOURSELF - OR THAT YOU ARE A FAILURE OR HAVE LET YOURSELF OR YOUR FAMILY DOWN: 1
1. LITTLE INTEREST OR PLEASURE IN DOING THINGS: 1
9. THOUGHTS THAT YOU WOULD BE BETTER OFF DEAD, OR OF HURTING YOURSELF: 0
SUM OF ALL RESPONSES TO PHQ QUESTIONS 1-9: 5
SUM OF ALL RESPONSES TO PHQ QUESTIONS 1-9: 5
3. TROUBLE FALLING OR STAYING ASLEEP: 1
4. FEELING TIRED OR HAVING LITTLE ENERGY: 1
SUM OF ALL RESPONSES TO PHQ QUESTIONS 1-9: 5
2. FEELING DOWN, DEPRESSED OR HOPELESS: 0
8. MOVING OR SPEAKING SO SLOWLY THAT OTHER PEOPLE COULD HAVE NOTICED. OR THE OPPOSITE, BEING SO FIGETY OR RESTLESS THAT YOU HAVE BEEN MOVING AROUND A LOT MORE THAN USUAL: 0
SUM OF ALL RESPONSES TO PHQ9 QUESTIONS 1 & 2: 1
7. TROUBLE CONCENTRATING ON THINGS, SUCH AS READING THE NEWSPAPER OR WATCHING TELEVISION: 0
SUM OF ALL RESPONSES TO PHQ QUESTIONS 1-9: 5
5. POOR APPETITE OR OVEREATING: 1

## 2024-03-01 NOTE — PROGRESS NOTES
Chief Complaint   Patient presents with    Immunizations     Pt has questions about Covid vaccine, she had one in November.   She is also concerned about the RSV vaccine.     Mammogram Referral     Pt would like an order for a mammogram to the  imaging center.     Discuss Medications     Pt was took off Meloxicam and has questions about Voltaren gel. Wants to know when or if she can go back on the meloxicam.   Can she take tylenol every day?           \"Have you been to the ER, urgent care clinic since your last visit?  Hospitalized since your last visit?\"    NO    “Have you seen or consulted any other health care providers outside of [facility] since your last visit?”    NO       Have you had a mammogram?”   NO, Pt would like a referral for this.

## 2024-03-01 NOTE — PATIENT INSTRUCTIONS
Topical over the counter medications:  - lidocaine - numbing  - diclofenac/voltaren - anti-inflammatory  - menthol - cooling  - capsacin - hot component of peppers  - other: emu oil, two old goats, CBD  - ice and heat

## 2024-03-01 NOTE — PROGRESS NOTES
Progress Note      Assessment/ Plan:     1. Major depressive disorder, recurrent, mild  Trial dose increase of Wellbutrin from 300 to 450 mg daily  -     TSH; Future  -     buPROPion (WELLBUTRIN XL) 300 MG extended release tablet; Take 1 tablet by mouth daily With 150 mg to total 450 mg daily, Disp-90 tablet, R-1Normal  -     buPROPion (WELLBUTRIN XL) 150 MG extended release tablet; Take 1 tablet by mouth every morning With 300 mg to total 450 mg daily, Disp-90 tablet, R-1Normal    2. Class 2 obesity with body mass index (BMI) of 39.0 to 39.9 in adult, unspecified obesity type, unspecified whether serious comorbidity present  Labs to assess for comorbidity  -     Lipid Panel; Future  -     TSH; Future  -     CBC with Auto Differential; Future  -     Hemoglobin A1C; Future    3. Age-related osteoporosis without current pathological fracture  -     alendronate (FOSAMAX) 35 MG tablet; Take 1 tablet by mouth every 7 days, Disp-12 tablet, R-3Normal    4. Stage 3a chronic kidney disease (HCC)  Patient interested in resuming meloxicam, reviewed risk of NSAIDs with renal disease  Reevaluate with labs  -     Comprehensive Metabolic Panel; Future  -     Microalbumin / Creatinine Urine Ratio; Future  -     CBC with Auto Differential; Future    5. Encounter for screening mammogram for malignant neoplasm of breast  -     JORGE DIGITAL SCREEN W OR WO CAD BILATERAL; Future        Return in about 6 months (around 9/1/2024) for follow-up with labs.      I have discussed the diagnosis with the patient and the intended plan as seen in the above orders.    The patient has received an after-visit summary and questions were answered concerning future plans.   Pt conveyed understanding of plan.    Medication Side Effects and Warnings were discussed with patient        Subjective:     Chief Complaint   Patient presents with    Immunizations     Pt has questions about Covid vaccine, she had one in November.   She is also concerned about the

## 2024-03-04 ENCOUNTER — PATIENT MESSAGE (OUTPATIENT)
Age: 69
End: 2024-03-04

## 2024-03-04 DIAGNOSIS — M15.9 PRIMARY OSTEOARTHRITIS INVOLVING MULTIPLE JOINTS: Primary | ICD-10-CM

## 2024-03-06 RX ORDER — MELOXICAM 7.5 MG/1
7.5 TABLET ORAL DAILY PRN
Qty: 30 TABLET | Refills: 0 | Status: SHIPPED | OUTPATIENT
Start: 2024-03-06

## 2024-03-27 ENCOUNTER — HOSPITAL ENCOUNTER (OUTPATIENT)
Facility: HOSPITAL | Age: 69
Discharge: HOME | End: 2024-03-30
Attending: STUDENT IN AN ORGANIZED HEALTH CARE EDUCATION/TRAINING PROGRAM
Payer: MEDICARE

## 2024-03-27 VITALS — WEIGHT: 224 LBS | BODY MASS INDEX: 38.24 KG/M2 | HEIGHT: 64 IN

## 2024-03-27 DIAGNOSIS — Z12.31 VISIT FOR SCREENING MAMMOGRAM: ICD-10-CM

## 2024-03-27 PROCEDURE — 77063 BREAST TOMOSYNTHESIS BI: CPT

## 2024-08-31 SDOH — ECONOMIC STABILITY: FOOD INSECURITY: WITHIN THE PAST 12 MONTHS, THE FOOD YOU BOUGHT JUST DIDN'T LAST AND YOU DIDN'T HAVE MONEY TO GET MORE.: NEVER TRUE

## 2024-08-31 SDOH — ECONOMIC STABILITY: TRANSPORTATION INSECURITY
IN THE PAST 12 MONTHS, HAS LACK OF TRANSPORTATION KEPT YOU FROM MEETINGS, WORK, OR FROM GETTING THINGS NEEDED FOR DAILY LIVING?: NO

## 2024-08-31 SDOH — ECONOMIC STABILITY: INCOME INSECURITY: HOW HARD IS IT FOR YOU TO PAY FOR THE VERY BASICS LIKE FOOD, HOUSING, MEDICAL CARE, AND HEATING?: NOT HARD AT ALL

## 2024-08-31 SDOH — ECONOMIC STABILITY: FOOD INSECURITY: WITHIN THE PAST 12 MONTHS, YOU WORRIED THAT YOUR FOOD WOULD RUN OUT BEFORE YOU GOT MONEY TO BUY MORE.: NEVER TRUE

## 2024-09-03 ENCOUNTER — OFFICE VISIT (OUTPATIENT)
Age: 69
End: 2024-09-03
Payer: MEDICARE

## 2024-09-03 VITALS
HEIGHT: 64 IN | HEART RATE: 89 BPM | RESPIRATION RATE: 18 BRPM | WEIGHT: 220 LBS | OXYGEN SATURATION: 95 % | DIASTOLIC BLOOD PRESSURE: 82 MMHG | SYSTOLIC BLOOD PRESSURE: 131 MMHG | BODY MASS INDEX: 37.56 KG/M2

## 2024-09-03 DIAGNOSIS — D64.9 ANEMIA, UNSPECIFIED TYPE: ICD-10-CM

## 2024-09-03 DIAGNOSIS — M54.2 CERVICAL PAIN (NECK): ICD-10-CM

## 2024-09-03 DIAGNOSIS — E78.5 HYPERLIPIDEMIA, UNSPECIFIED HYPERLIPIDEMIA TYPE: ICD-10-CM

## 2024-09-03 DIAGNOSIS — E66.01 SEVERE OBESITY (BMI 35.0-39.9) WITH COMORBIDITY (HCC): ICD-10-CM

## 2024-09-03 DIAGNOSIS — N18.31 STAGE 3A CHRONIC KIDNEY DISEASE (HCC): ICD-10-CM

## 2024-09-03 DIAGNOSIS — M15.9 PRIMARY OSTEOARTHRITIS INVOLVING MULTIPLE JOINTS: ICD-10-CM

## 2024-09-03 DIAGNOSIS — M81.0 AGE-RELATED OSTEOPOROSIS WITHOUT CURRENT PATHOLOGICAL FRACTURE: Primary | ICD-10-CM

## 2024-09-03 DIAGNOSIS — M54.9 UPPER BACK PAIN: ICD-10-CM

## 2024-09-03 PROCEDURE — G8427 DOCREV CUR MEDS BY ELIG CLIN: HCPCS | Performed by: STUDENT IN AN ORGANIZED HEALTH CARE EDUCATION/TRAINING PROGRAM

## 2024-09-03 PROCEDURE — G8399 PT W/DXA RESULTS DOCUMENT: HCPCS | Performed by: STUDENT IN AN ORGANIZED HEALTH CARE EDUCATION/TRAINING PROGRAM

## 2024-09-03 PROCEDURE — 1090F PRES/ABSN URINE INCON ASSESS: CPT | Performed by: STUDENT IN AN ORGANIZED HEALTH CARE EDUCATION/TRAINING PROGRAM

## 2024-09-03 PROCEDURE — 3017F COLORECTAL CA SCREEN DOC REV: CPT | Performed by: STUDENT IN AN ORGANIZED HEALTH CARE EDUCATION/TRAINING PROGRAM

## 2024-09-03 PROCEDURE — 99214 OFFICE O/P EST MOD 30 MIN: CPT | Performed by: STUDENT IN AN ORGANIZED HEALTH CARE EDUCATION/TRAINING PROGRAM

## 2024-09-03 PROCEDURE — 1036F TOBACCO NON-USER: CPT | Performed by: STUDENT IN AN ORGANIZED HEALTH CARE EDUCATION/TRAINING PROGRAM

## 2024-09-03 PROCEDURE — 1123F ACP DISCUSS/DSCN MKR DOCD: CPT | Performed by: STUDENT IN AN ORGANIZED HEALTH CARE EDUCATION/TRAINING PROGRAM

## 2024-09-03 PROCEDURE — G8417 CALC BMI ABV UP PARAM F/U: HCPCS | Performed by: STUDENT IN AN ORGANIZED HEALTH CARE EDUCATION/TRAINING PROGRAM

## 2024-09-03 RX ORDER — MELOXICAM 7.5 MG/1
7.5 TABLET ORAL DAILY PRN
Qty: 30 TABLET | Refills: 1 | Status: SHIPPED | OUTPATIENT
Start: 2024-09-03

## 2024-09-03 RX ORDER — TIZANIDINE 2 MG/1
2-4 TABLET ORAL 3 TIMES DAILY PRN
Qty: 60 TABLET | Refills: 1 | Status: SHIPPED | OUTPATIENT
Start: 2024-09-03

## 2024-09-03 RX ORDER — FAMOTIDINE 40 MG/1
40 TABLET, FILM COATED ORAL
COMMUNITY
Start: 2024-08-26

## 2024-09-04 LAB
ALBUMIN SERPL-MCNC: 4.2 G/DL (ref 3.9–4.9)
ALP SERPL-CCNC: 143 IU/L (ref 44–121)
ALT SERPL-CCNC: 92 IU/L (ref 0–32)
AST SERPL-CCNC: 114 IU/L (ref 0–40)
BILIRUB SERPL-MCNC: 0.5 MG/DL (ref 0–1.2)
BUN SERPL-MCNC: 20 MG/DL (ref 8–27)
BUN/CREAT SERPL: 19 (ref 12–28)
CALCIUM SERPL-MCNC: 9.7 MG/DL (ref 8.7–10.3)
CHLORIDE SERPL-SCNC: 107 MMOL/L (ref 96–106)
CO2 SERPL-SCNC: 21 MMOL/L (ref 20–29)
CREAT SERPL-MCNC: 1.04 MG/DL (ref 0.57–1)
EGFRCR SERPLBLD CKD-EPI 2021: 58 ML/MIN/1.73
ERYTHROCYTE [DISTWIDTH] IN BLOOD BY AUTOMATED COUNT: 13.2 % (ref 11.7–15.4)
FERRITIN SERPL-MCNC: 153 NG/ML (ref 15–150)
FOLATE SERPL-MCNC: 18.1 NG/ML
GLOBULIN SER CALC-MCNC: 2.7 G/DL (ref 1.5–4.5)
GLUCOSE SERPL-MCNC: 106 MG/DL (ref 70–99)
HCT VFR BLD AUTO: 41.3 % (ref 34–46.6)
HGB BLD-MCNC: 13.7 G/DL (ref 11.1–15.9)
IRON SATN MFR SERPL: 14 % (ref 15–55)
IRON SERPL-MCNC: 44 UG/DL (ref 27–139)
MCH RBC QN AUTO: 30.2 PG (ref 26.6–33)
MCHC RBC AUTO-ENTMCNC: 33.2 G/DL (ref 31.5–35.7)
MCV RBC AUTO: 91 FL (ref 79–97)
PLATELET # BLD AUTO: 183 X10E3/UL (ref 150–450)
POTASSIUM SERPL-SCNC: 4.5 MMOL/L (ref 3.5–5.2)
PROT SERPL-MCNC: 6.9 G/DL (ref 6–8.5)
RBC # BLD AUTO: 4.53 X10E6/UL (ref 3.77–5.28)
REPORT: NORMAL
SODIUM SERPL-SCNC: 143 MMOL/L (ref 134–144)
TIBC SERPL-MCNC: 310 UG/DL (ref 250–450)
UIBC SERPL-MCNC: 266 UG/DL (ref 118–369)
VIT B12 SERPL-MCNC: 1298 PG/ML (ref 232–1245)
WBC # BLD AUTO: 6.7 X10E3/UL (ref 3.4–10.8)

## 2024-09-05 ENCOUNTER — PATIENT MESSAGE (OUTPATIENT)
Age: 69
End: 2024-09-05

## 2024-09-05 DIAGNOSIS — R79.89 ELEVATED LFTS: Primary | ICD-10-CM

## 2024-09-12 DIAGNOSIS — R79.89 ELEVATED LFTS: ICD-10-CM

## 2024-10-21 ENCOUNTER — HOSPITAL ENCOUNTER (OUTPATIENT)
Facility: HOSPITAL | Age: 69
Discharge: HOME | End: 2024-10-24

## 2024-10-21 ENCOUNTER — TRANSCRIBE ORDERS (OUTPATIENT)
Facility: HOSPITAL | Age: 69
End: 2024-10-21

## 2024-10-21 DIAGNOSIS — R79.89 HYPOURICEMIA: ICD-10-CM

## 2024-10-21 DIAGNOSIS — R79.89 HYPOURICEMIA: Primary | ICD-10-CM

## 2024-10-22 LAB
ALBUMIN SERPL-MCNC: 4.5 G/DL (ref 3.9–4.9)
ALP SERPL-CCNC: 111 IU/L (ref 44–121)
ALT SERPL-CCNC: 14 IU/L (ref 0–32)
AST SERPL-CCNC: 20 IU/L (ref 0–40)
BILIRUB DIRECT SERPL-MCNC: 0.19 MG/DL (ref 0–0.4)
BILIRUB SERPL-MCNC: 0.9 MG/DL (ref 0–1.2)
PROT SERPL-MCNC: 7.2 G/DL (ref 6–8.5)

## 2024-10-30 ENCOUNTER — HOSPITAL ENCOUNTER (OUTPATIENT)
Facility: HOSPITAL | Age: 69
Discharge: HOME | End: 2024-11-02
Attending: STUDENT IN AN ORGANIZED HEALTH CARE EDUCATION/TRAINING PROGRAM
Payer: MEDICARE

## 2024-10-30 DIAGNOSIS — M81.0 AGE-RELATED OSTEOPOROSIS WITHOUT CURRENT PATHOLOGICAL FRACTURE: ICD-10-CM

## 2024-10-30 PROCEDURE — 77080 DXA BONE DENSITY AXIAL: CPT

## 2025-02-26 SDOH — HEALTH STABILITY: PHYSICAL HEALTH: ON AVERAGE, HOW MANY DAYS PER WEEK DO YOU ENGAGE IN MODERATE TO STRENUOUS EXERCISE (LIKE A BRISK WALK)?: 0 DAYS

## 2025-02-26 ASSESSMENT — PATIENT HEALTH QUESTIONNAIRE - PHQ9
SUM OF ALL RESPONSES TO PHQ QUESTIONS 1-9: 0
10. IF YOU CHECKED OFF ANY PROBLEMS, HOW DIFFICULT HAVE THESE PROBLEMS MADE IT FOR YOU TO DO YOUR WORK, TAKE CARE OF THINGS AT HOME, OR GET ALONG WITH OTHER PEOPLE: NOT DIFFICULT AT ALL
1. LITTLE INTEREST OR PLEASURE IN DOING THINGS: NOT AT ALL
SUM OF ALL RESPONSES TO PHQ QUESTIONS 1-9: 0
4. FEELING TIRED OR HAVING LITTLE ENERGY: NOT AT ALL
3. TROUBLE FALLING OR STAYING ASLEEP: NOT AT ALL
2. FEELING DOWN, DEPRESSED OR HOPELESS: NOT AT ALL
8. MOVING OR SPEAKING SO SLOWLY THAT OTHER PEOPLE COULD HAVE NOTICED. OR THE OPPOSITE, BEING SO FIGETY OR RESTLESS THAT YOU HAVE BEEN MOVING AROUND A LOT MORE THAN USUAL: NOT AT ALL
SUM OF ALL RESPONSES TO PHQ QUESTIONS 1-9: 0
6. FEELING BAD ABOUT YOURSELF - OR THAT YOU ARE A FAILURE OR HAVE LET YOURSELF OR YOUR FAMILY DOWN: NOT AT ALL
9. THOUGHTS THAT YOU WOULD BE BETTER OFF DEAD, OR OF HURTING YOURSELF: NOT AT ALL
5. POOR APPETITE OR OVEREATING: NOT AT ALL
SUM OF ALL RESPONSES TO PHQ QUESTIONS 1-9: 0
7. TROUBLE CONCENTRATING ON THINGS, SUCH AS READING THE NEWSPAPER OR WATCHING TELEVISION: NOT AT ALL

## 2025-02-26 ASSESSMENT — LIFESTYLE VARIABLES
HOW OFTEN DO YOU HAVE A DRINK CONTAINING ALCOHOL: MONTHLY OR LESS
HOW MANY STANDARD DRINKS CONTAINING ALCOHOL DO YOU HAVE ON A TYPICAL DAY: 1
HOW OFTEN DO YOU HAVE A DRINK CONTAINING ALCOHOL: 2
HOW MANY STANDARD DRINKS CONTAINING ALCOHOL DO YOU HAVE ON A TYPICAL DAY: 1 OR 2
HOW OFTEN DO YOU HAVE SIX OR MORE DRINKS ON ONE OCCASION: 1

## 2025-03-01 SDOH — ECONOMIC STABILITY: FOOD INSECURITY: WITHIN THE PAST 12 MONTHS, THE FOOD YOU BOUGHT JUST DIDN'T LAST AND YOU DIDN'T HAVE MONEY TO GET MORE.: NEVER TRUE

## 2025-03-01 SDOH — ECONOMIC STABILITY: INCOME INSECURITY: IN THE LAST 12 MONTHS, WAS THERE A TIME WHEN YOU WERE NOT ABLE TO PAY THE MORTGAGE OR RENT ON TIME?: NO

## 2025-03-01 SDOH — ECONOMIC STABILITY: TRANSPORTATION INSECURITY
IN THE PAST 12 MONTHS, HAS THE LACK OF TRANSPORTATION KEPT YOU FROM MEDICAL APPOINTMENTS OR FROM GETTING MEDICATIONS?: NO

## 2025-03-01 SDOH — ECONOMIC STABILITY: FOOD INSECURITY: WITHIN THE PAST 12 MONTHS, YOU WORRIED THAT YOUR FOOD WOULD RUN OUT BEFORE YOU GOT MONEY TO BUY MORE.: NEVER TRUE

## 2025-03-04 ENCOUNTER — OFFICE VISIT (OUTPATIENT)
Facility: CLINIC | Age: 70
End: 2025-03-04
Payer: MEDICARE

## 2025-03-04 VITALS
OXYGEN SATURATION: 97 % | BODY MASS INDEX: 36.19 KG/M2 | DIASTOLIC BLOOD PRESSURE: 84 MMHG | RESPIRATION RATE: 18 BRPM | SYSTOLIC BLOOD PRESSURE: 130 MMHG | HEART RATE: 71 BPM | WEIGHT: 212 LBS | HEIGHT: 64 IN

## 2025-03-04 DIAGNOSIS — M54.9 UPPER BACK PAIN: ICD-10-CM

## 2025-03-04 DIAGNOSIS — N18.31 STAGE 3A CHRONIC KIDNEY DISEASE (HCC): ICD-10-CM

## 2025-03-04 DIAGNOSIS — M81.0 AGE-RELATED OSTEOPOROSIS WITHOUT CURRENT PATHOLOGICAL FRACTURE: ICD-10-CM

## 2025-03-04 DIAGNOSIS — E78.5 HYPERLIPIDEMIA, UNSPECIFIED HYPERLIPIDEMIA TYPE: ICD-10-CM

## 2025-03-04 DIAGNOSIS — M15.0 PRIMARY OSTEOARTHRITIS INVOLVING MULTIPLE JOINTS: ICD-10-CM

## 2025-03-04 DIAGNOSIS — Z00.00 MEDICARE ANNUAL WELLNESS VISIT, SUBSEQUENT: Primary | ICD-10-CM

## 2025-03-04 DIAGNOSIS — M54.2 CERVICAL PAIN (NECK): ICD-10-CM

## 2025-03-04 PROCEDURE — 99214 OFFICE O/P EST MOD 30 MIN: CPT | Performed by: STUDENT IN AN ORGANIZED HEALTH CARE EDUCATION/TRAINING PROGRAM

## 2025-03-04 RX ORDER — TROSPIUM CHLORIDE ER 60 MG/1
60 CAPSULE ORAL DAILY
COMMUNITY
Start: 2025-02-24

## 2025-03-04 RX ORDER — NITROFURANTOIN 25; 75 MG/1; MG/1
CAPSULE ORAL
COMMUNITY
Start: 2025-02-27

## 2025-03-04 RX ORDER — TIZANIDINE 2 MG/1
2-4 TABLET ORAL 3 TIMES DAILY PRN
Qty: 60 TABLET | Refills: 1 | Status: SHIPPED | OUTPATIENT
Start: 2025-03-04

## 2025-03-04 RX ORDER — ESTRADIOL 0.1 MG/G
CREAM VAGINAL
COMMUNITY
Start: 2025-02-24

## 2025-03-04 RX ORDER — ALENDRONATE SODIUM 35 MG/1
35 TABLET ORAL
Qty: 12 TABLET | Refills: 3 | Status: SHIPPED | OUTPATIENT
Start: 2025-03-04

## 2025-03-04 NOTE — PROGRESS NOTES
urgent care clinic since your last visit?  Hospitalized since your last visit?\"    NO    “Have you seen or consulted any other health care providers outside of [facility] since your last visit?”    NO        “Have you had a colorectal cancer screening such as a colonoscopy/FIT/Cologuard?    YES - Type: Colonoscopy - Where: 11/2024, Dr Anibal Amezcua/[facility] to request most recent records if not in the chart     Date of last Colonoscopy: 11/3/2021  No cologuard on file  No FIT/FOBT on file   No flexible sigmoidoscopy on file         Click Here for Release of Records Request      --Estefania Ivey CMA       ------------------------------------------------  
cognitive function.    She also reports occasional balance issues but has not experienced any falls. She has noticed a progression in her arthritis, particularly in her hands, with one hand exhibiting more severe symptoms than the other. She is right-handed and has been attempting to use her left hand more. She recently started doing some exercises for her hands. She has been using tight gloves at night, which seem to alleviate morning swelling. She has been taking meloxicam sparingly, with only 2 or 3 doses in the past 6 months. She also takes tizanidine, which she finds helpful for her shoulder pain. She reports that her handwriting has deteriorated and she occasionally struggles with grasping objects, although these symptoms tend to improve as the day progresses. She experiences intermittent foot pain, alternating between the left and right foot. She has lost 8 pounds over the past 6 months due to reduced food intake and increased physical activity, including walking more frequently.    She does not use any ear drops or Q-tips for ear cleaning.    She is due for a mammogram and has been undergoing them every 2 years. She had a colonoscopy in 11/2024. She is currently taking vitamin D3 supplements daily. She is fasting today.    Supplemental Information  She saw a urologist in 12/2024 for the first time and then again last week. She was put on a new medicine, trospium. She had a urinary tract infection in 01/2025 and is currently on an antibiotic.    FAMILY HISTORY  Her mother had 3 brothers who were all diagnosed with Alzheimer's in their 90s. Her mother passed away at almost 76 years old.    MEDICATIONS  Current: Meloxicam, tizanidine, vitamin D3.        Patient's complete Health Risk Assessment and screening values have been reviewed and are found in Flowsheets. The following problems were reviewed today and where indicated follow up appointments were made and/or referrals ordered.    Positive Risk Factor

## 2025-03-05 LAB
25(OH)D3 SERPL-MCNC: 38.9 NG/ML (ref 30–100)
ALBUMIN SERPL-MCNC: 4.2 G/DL (ref 3.5–5)
ALBUMIN/GLOB SERPL: 1.2 (ref 1.1–2.2)
ALP SERPL-CCNC: 98 U/L (ref 45–117)
ALT SERPL-CCNC: 20 U/L (ref 12–78)
ANION GAP SERPL CALC-SCNC: 5 MMOL/L (ref 2–12)
AST SERPL-CCNC: 18 U/L (ref 15–37)
BILIRUB SERPL-MCNC: 1.4 MG/DL (ref 0.2–1)
BUN SERPL-MCNC: 24 MG/DL (ref 6–20)
BUN/CREAT SERPL: 22 (ref 12–20)
CALCIUM SERPL-MCNC: 10.5 MG/DL (ref 8.5–10.1)
CHLORIDE SERPL-SCNC: 108 MMOL/L (ref 97–108)
CHOLEST SERPL-MCNC: 200 MG/DL
CO2 SERPL-SCNC: 26 MMOL/L (ref 21–32)
CREAT SERPL-MCNC: 1.08 MG/DL (ref 0.55–1.02)
CREAT UR-MCNC: 112 MG/DL
ERYTHROCYTE [DISTWIDTH] IN BLOOD BY AUTOMATED COUNT: 13.2 % (ref 11.5–14.5)
GLOBULIN SER CALC-MCNC: 3.4 G/DL (ref 2–4)
GLUCOSE SERPL-MCNC: 107 MG/DL (ref 65–100)
HCT VFR BLD AUTO: 44.1 % (ref 35–47)
HDLC SERPL-MCNC: 64 MG/DL
HDLC SERPL: 3.1 (ref 0–5)
HGB BLD-MCNC: 14.4 G/DL (ref 11.5–16)
LDLC SERPL CALC-MCNC: 114.8 MG/DL (ref 0–100)
MCH RBC QN AUTO: 31.7 PG (ref 26–34)
MCHC RBC AUTO-ENTMCNC: 32.7 G/DL (ref 30–36.5)
MCV RBC AUTO: 97.1 FL (ref 80–99)
MICROALBUMIN UR-MCNC: 0.84 MG/DL
MICROALBUMIN/CREAT UR-RTO: 8 MG/G (ref 0–30)
NRBC # BLD: 0 K/UL (ref 0–0.01)
NRBC BLD-RTO: 0 PER 100 WBC
PLATELET # BLD AUTO: 245 K/UL (ref 150–400)
PMV BLD AUTO: 10.4 FL (ref 8.9–12.9)
POTASSIUM SERPL-SCNC: 4.7 MMOL/L (ref 3.5–5.1)
PROT SERPL-MCNC: 7.6 G/DL (ref 6.4–8.2)
RBC # BLD AUTO: 4.54 M/UL (ref 3.8–5.2)
SODIUM SERPL-SCNC: 139 MMOL/L (ref 136–145)
TRIGL SERPL-MCNC: 106 MG/DL
TSH SERPL DL<=0.05 MIU/L-ACNC: 1.61 UIU/ML (ref 0.36–3.74)
VLDLC SERPL CALC-MCNC: 21.2 MG/DL
WBC # BLD AUTO: 5.5 K/UL (ref 3.6–11)

## 2025-03-06 ENCOUNTER — PATIENT MESSAGE (OUTPATIENT)
Facility: CLINIC | Age: 70
End: 2025-03-06

## 2025-03-11 RX ORDER — BACLOFEN 5 MG/1
5-10 TABLET ORAL 3 TIMES DAILY PRN
Qty: 90 TABLET | Refills: 1 | Status: SHIPPED | OUTPATIENT
Start: 2025-03-11

## 2025-05-08 ENCOUNTER — PATIENT MESSAGE (OUTPATIENT)
Facility: CLINIC | Age: 70
End: 2025-05-08